# Patient Record
Sex: FEMALE | Race: WHITE | NOT HISPANIC OR LATINO | Employment: FULL TIME | URBAN - METROPOLITAN AREA
[De-identification: names, ages, dates, MRNs, and addresses within clinical notes are randomized per-mention and may not be internally consistent; named-entity substitution may affect disease eponyms.]

---

## 2017-10-20 ENCOUNTER — OFFICE VISIT (OUTPATIENT)
Dept: URGENT CARE | Facility: CLINIC | Age: 57
End: 2017-10-20
Payer: COMMERCIAL

## 2017-10-20 ENCOUNTER — APPOINTMENT (OUTPATIENT)
Dept: RADIOLOGY | Facility: CLINIC | Age: 57
End: 2017-10-20
Payer: COMMERCIAL

## 2017-10-20 DIAGNOSIS — M79.672 PAIN OF LEFT FOOT: ICD-10-CM

## 2017-10-20 PROCEDURE — 73630 X-RAY EXAM OF FOOT: CPT

## 2017-10-20 PROCEDURE — 99203 OFFICE O/P NEW LOW 30 MIN: CPT

## 2017-11-04 NOTE — PROGRESS NOTES
Assessment  1  Left foot pain (729 5) (M20 346)    Plan   Left foot pain    · Ace Bandage; Status:Complete;   Done: [id number]   · Air cast; Status:Temporary Deferral - Pt refuses,wants ace wrap and cast shoe not  comfortable ;    · Post Op Shoe; Status:Complete;   Done: [id number]    * XR FOOT 3+ VIEW LEFT; Status:Resulted - Requires Verification;   Done: [id number] 12:00AM  Due:21Oct2017;Ordered; Stat;    For:Left foot pain; Ordered By:Demetrice Landeros;      Discussion/Summary  Discussion Summary:   Follow up with pcp meds as directed and elevate the foot cast shoe or hard service shoes, limit walking long distance  Medication Side Effects Reviewed: Possible side effects of new medications were reviewed with the patient/guardian today  Understands and agrees with treatment plan: The treatment plan was reviewed with the patient/guardian  The patient/guardian understands and agrees with the treatment plan   Counseling Documentation With Imm: The patient was counseled regarding instructions for management,-- patient and family education,-- importance of compliance with treatment  Follow Up Instructions: Follow Up with your Primary Care Provider in 1-2 days  If your symptoms worsen, go to the nearest [facility] Emergency Department  Chief Complaint  1  Foot Problem  Chief Complaint Free Text Note Form: pt reports L foot pain, felt twinge in foot on lateral aspect; swelling and bruising on medial aspect arch; took ibuprofen @ 1700 tonight, wrapped with ace bandage; pain 2/10      History of Present Illness  HPI: 55 yo female who is here today for left foot pain, pain present in the arch of the left foot, some bruising noted to the left lateral aspect of the left foot, moderate amt of pain to the foot when walking on it  She tripped over a rug on monday and hasnât had much of an issue since until walking up the stairs and felt a twinge   symptoms are worsening and wants [facility] Based Practices Required Assessment:   Pain Assessment   the patient states they have pain  (on a scale of 0 to 10, the patient rates the pain at 2 )   Abuse And Domestic Violence Screen    Yes, the patient is safe at home  -- The patient states no one is hurting them  Depression And Suicide Screen  No, the patient has not had thoughts of hurting themself  No, the patient has not felt depressed in the past 7 days  Prefered Language is  english  Primary Language is  english  Foot Problem: Melum 66 presents with complaints of foot problem  Associated symptoms include foot pain, but-- no foot swelling,-- no foot numbness,-- no foot cramps,-- no discolored toenails,-- no peeling skin-- and-- no ingrown toenails  Review of Systems  Focused-Female:   Constitutional: as noted in HPI  Musculoskeletal: as noted in HPI  Integumentary: as noted in HPI  Neurological: as noted in HPI  ROS Reviewed:   ROS reviewed  Current Meds  1  Farxiga 10 MG Oral Tablet; Therapy: (Recorded:[id number]) to Recorded  2  GlyBURIDE 2 5 MG Oral Tablet; Therapy: (Recorded:20Oct2017) to Recorded  3  Losartan Potassium 25 MG Oral Tablet; Therapy: (Recorded:[id number]) to Recorded  4  MetFORMIN HCl ER (MOD) 1000 MG Oral Tablet Extended Release 24 Hour; Therapy: (Recorded:[id number]) to Recorded  Medication List Reviewed: The medication list was reviewed and updated today  Allergies  1  Ceclor  2  Penicillins    Vitals  Signs   Recorded: [id number] 07:35PM   Temperature: 97 2 F  Heart Rate: 92  Respiration: 16  Systolic: 951  Diastolic: 81  Height: 5 ft 8 in  Weight: 205 lb   BMI Calculated: 31 17  BSA Calculated: 2 07  O2 Saturation: 98  Pain Scale: 2    Physical Exam    Constitutional   General appearance: No acute distress, well appearing and well nourished  Musculoskeletal   Gait and station: Abnormal  -- pain with ambulating full weight on the left foot,  Digits and nails: Normal without clubbing or cyanosis  Inspection/palpation of joints, bones, and muscles: Normal     Skin   Skin and subcutaneous tissue: Normal without rashes or lesions  Neurologic   Reflexes: 2+ and symmetric  Sensation: No sensory loss  Psychiatric   Orientation to person, place, and time: Normal     Mood and affect: Normal        Results/Data  Diagnostic Studies Reviewed: I personally reviewed the films/images/results in the office today  My interpretation follows  X-ray Review no fx  Message  Return to work or school:   Anneliese Gilbert is under my professional care  She was seen in my office on 10/20/2017   She is able to return to work on  10/21/2017      Limit walking long distance, use cast shoe until seen if pain worsens, rest and elevate the foot  Signatures   Electronically signed by :  KELLEE Marvin; Oct 21 2017  5:23PM EST                       (Author)    Electronically signed by : Dallas Fung DO; Nov  3 2017  7:55PM EST                       (Co-author)

## 2017-12-16 ENCOUNTER — OFFICE VISIT (OUTPATIENT)
Dept: URGENT CARE | Facility: CLINIC | Age: 57
End: 2017-12-16
Payer: COMMERCIAL

## 2017-12-16 PROCEDURE — 99213 OFFICE O/P EST LOW 20 MIN: CPT

## 2017-12-20 NOTE — PROGRESS NOTES
Assessment  1  Left foot pain (729 5) (M79 672)   2  Foot infection (686 9) (L08 9)    Plan  Foot infection    · Ciprofloxacin HCl - 500 MG Oral Tablet; TAKE 1 TABLET TWICE DAILY    Discussion/Summary  Discussion Summary:   Follow up with pcp, follow up with ortho on Monday Take meds as directed  Tylenol or Motrin for pain or fever if symptoms worsen discussed streaks on torso or plantar aspect of the left foot go to ER or be Re seen denies having any injury or trauma to the left foot  Medication Side Effects Reviewed: Possible side effects of new medications were reviewed with the patient/guardian today  Understands and agrees with treatment plan: The treatment plan was reviewed with the patient/guardian  The patient/guardian understands and agrees with the treatment plan   Counseling Documentation With Imm: The patient was counseled regarding instructions for management,-- patient and family education,-- importance of compliance with treatment  Follow Up Instructions: Follow Up with your Primary Care Provider in 1-2 days  If your symptoms worsen, go to the [place] [address] Emergency Department  Chief Complaint  1  Foot Problem  Chief Complaint Free Text Note Form: pt reports L foot swelling; was here in September for same issue; swelling and redness noted on outer aspect and 4th and 5th digit; pt reports being on her feet most of yesterday - runs and adult day program; pain 1/10; took ibuprofen 0500      History of Present Illness  HPI: 55 yo female is here today for left foot pain, no change in symptoms since October and has had podiatry care since per pt, she has a lot of redness to the lateral aspect of the left foot and has good ROM  [facility] Practices Required Assessment:  Pain Assessment  the patient states they have pain  (on a scale of 0 to 10, the patient rates the pain at 1 )  Abuse And Domestic Violence Screen   Yes, the patient is safe at home  -- The patient states no one is hurting them  Depression And Suicide Screen  No, the patient has not had thoughts of hurting themself  Yes, the patient has felt depressed in the past 7 days  Referral made to    mother recently passed away  Prefered Language is  english  Primary Language is  english  Review of Systems  Focused-Female:  Constitutional: as noted in HPI  Musculoskeletal: as noted in HPI  Integumentary: as noted in HPI  ROS Reviewed:   ROS reviewed  Active Problems  1  Left foot pain (729 5) (M85 562)    Current Meds   1  Farxiga 10 MG Oral Tablet; Therapy: (Recorded:20Oct2017) to Recorded   2  GlyBURIDE 2 5 MG Oral Tablet; Therapy: (Recorded:20Oct2017) to Recorded   3  Losartan Potassium 25 MG Oral Tablet; Therapy: (Recorded:20Oct2017) to Recorded   4  MetFORMIN HCl ER (MOD) 1000 MG Oral Tablet Extended Release 24 Hour; Therapy: (Recorded:20Oct2017) to Recorded  Medication List Reviewed: The medication list was reviewed and updated today  Allergies  1  Ceclor   2  Penicillins    Vitals  Signs   Recorded: [id number] 10:18AM   Temperature: 97 8 F  Heart Rate: 96  Respiration: 16  Systolic: 871  Diastolic: 99  Height: 5 ft 8 in  Weight: 209 lb   BMI Calculated: 31 78  BSA Calculated: 2 08  O2 Saturation: 99    Physical Exam   Constitutional  General appearance: No acute distress, well appearing and well nourished  Eyes  Conjunctiva and lids: No swelling, erythema or discharge  Pupils and irises: Equal, round and reactive to light  Pulmonary  Respiratory effort: No increased work of breathing or signs of respiratory distress  Auscultation of lungs: Clear to auscultation  Cardiovascular  Auscultation of heart: Normal rate and rhythm, normal S1 and S2, without murmurs  Musculoskeletal  Gait and station: Normal    Digits and nails: Abnormal  -- Patient is 2nd digit of the left foot and 3rd digit are red and inflamed   Erythema expands the dorsal aspect of the left foot at the base of the 2nd 3rd 4th and 5th digits  Patient is able to ambulate and play she was on a slight discomfort  She has a podiatry appointment on Monday  Signatures   Electronically signed by :  KELLEE Sanford; Dec 16 2017 12:40PM EST                       (Author)    Electronically signed by : Damir Harvey DO; Dec 19 2017  2:54PM EST                       (Co-author)

## 2018-01-18 NOTE — MISCELLANEOUS
Message  Return to work or school:   Mumtaz Sanchez is under my professional care  She was seen in my office on 10/20/2017   She is able to return to work on  10/21/2017      Limit walking long distance, use cast shoe until seen if pain worsens, rest and elevate the foot  Signatures   Electronically signed by : KELLEE Huddleston; Oct 21 2017  5:23PM EST                       (Author)    Electronically signed by :  KELLEE Huddleston; Oct 21 2017  5:25PM EST                          Electronically signed by : Catarino Sams DO; Nov  3 2017  7:55PM EST                       (Co-author)

## 2018-01-23 VITALS
DIASTOLIC BLOOD PRESSURE: 99 MMHG | HEIGHT: 68 IN | BODY MASS INDEX: 31.67 KG/M2 | RESPIRATION RATE: 16 BRPM | TEMPERATURE: 97.8 F | OXYGEN SATURATION: 99 % | WEIGHT: 209 LBS | HEART RATE: 96 BPM | SYSTOLIC BLOOD PRESSURE: 169 MMHG

## 2018-05-21 ENCOUNTER — TRANSCRIBE ORDERS (OUTPATIENT)
Dept: ADMINISTRATIVE | Facility: HOSPITAL | Age: 58
End: 2018-05-21

## 2018-05-21 DIAGNOSIS — I73.9 PERIPHERAL VASCULAR DISEASE, UNSPECIFIED (HCC): Primary | ICD-10-CM

## 2018-05-25 ENCOUNTER — HOSPITAL ENCOUNTER (OUTPATIENT)
Dept: NON INVASIVE DIAGNOSTICS | Facility: HOSPITAL | Age: 58
Discharge: HOME | End: 2018-05-25
Attending: PODIATRIST
Payer: COMMERCIAL

## 2018-05-25 DIAGNOSIS — I73.9 PERIPHERAL VASCULAR DISEASE, UNSPECIFIED (HCC): ICD-10-CM

## 2018-05-25 PROCEDURE — 93925 LOWER EXTREMITY STUDY: CPT | Performed by: SURGERY

## 2018-05-25 PROCEDURE — 93925 LOWER EXTREMITY STUDY: CPT

## 2018-05-25 PROCEDURE — 93923 UPR/LXTR ART STDY 3+ LVLS: CPT

## 2018-05-25 PROCEDURE — 93922 UPR/L XTREMITY ART 2 LEVELS: CPT | Performed by: SURGERY

## 2018-06-30 ENCOUNTER — HOSPITAL ENCOUNTER (OUTPATIENT)
Dept: RADIOLOGY | Facility: HOSPITAL | Age: 58
Discharge: HOME | End: 2018-06-30
Attending: PODIATRIST
Payer: COMMERCIAL

## 2018-06-30 ENCOUNTER — TRANSCRIBE ORDERS (OUTPATIENT)
Dept: ADMINISTRATIVE | Facility: HOSPITAL | Age: 58
End: 2018-06-30

## 2018-06-30 DIAGNOSIS — M79.672 LEFT FOOT PAIN: Primary | ICD-10-CM

## 2018-06-30 DIAGNOSIS — M79.672 LEFT FOOT PAIN: ICD-10-CM

## 2018-06-30 PROCEDURE — 73630 X-RAY EXAM OF FOOT: CPT

## 2019-02-21 ENCOUNTER — TELEPHONE (OUTPATIENT)
Dept: FAMILY MEDICINE CLINIC | Facility: CLINIC | Age: 59
End: 2019-02-21

## 2020-01-06 ENCOUNTER — OFFICE VISIT (OUTPATIENT)
Dept: PODIATRY | Facility: CLINIC | Age: 60
End: 2020-01-06
Payer: COMMERCIAL

## 2020-01-06 VITALS
HEIGHT: 68 IN | DIASTOLIC BLOOD PRESSURE: 80 MMHG | HEART RATE: 83 BPM | WEIGHT: 166 LBS | SYSTOLIC BLOOD PRESSURE: 121 MMHG | BODY MASS INDEX: 25.16 KG/M2

## 2020-01-06 DIAGNOSIS — E11.42 DIABETIC POLYNEUROPATHY ASSOCIATED WITH TYPE 2 DIABETES MELLITUS (HCC): ICD-10-CM

## 2020-01-06 DIAGNOSIS — M14.672 CHARCOT'S JOINT OF LEFT FOOT: Primary | ICD-10-CM

## 2020-01-06 DIAGNOSIS — L84 CORNS AND CALLOSITIES: ICD-10-CM

## 2020-01-06 PROBLEM — E11.40 DIABETIC NEUROPATHY (HCC): Status: ACTIVE | Noted: 2020-01-06

## 2020-01-06 PROCEDURE — 11055 PARING/CUTG B9 HYPRKER LES 1: CPT | Performed by: PODIATRIST

## 2020-01-06 PROCEDURE — 99213 OFFICE O/P EST LOW 20 MIN: CPT | Performed by: PODIATRIST

## 2020-01-06 RX ORDER — LOSARTAN POTASSIUM 25 MG/1
TABLET ORAL
COMMUNITY

## 2020-01-06 RX ORDER — SIMVASTATIN 5 MG
TABLET ORAL
COMMUNITY
Start: 2019-12-19

## 2020-01-06 NOTE — PROGRESS NOTES
This patient was seen on 1/6/20  Assessment:    Problem List Items Addressed This Visit        Endocrine    Diabetic neuropathy ([facility] [address] )    Relevant Medications    metFORMIN (GLUCOPHAGE) 1000 MG tablet    Other Relevant Orders    Diabetic Shoe    Diabetic Shoe Inserts       Musculoskeletal and Integument    Charcot's joint of left foot - Primary    Relevant Orders    Diabetic Shoe    Diabetic Shoe Inserts          Plan:  1  DM foot exam performed  2  Callous pared down with scalpel x 1  Lesion Destruction  Date/Time: 1/6/2020 9:14 AM  Performed by: Olivia Suazo DPM  Authorized by: Olivia Suazo DPM     Procedure Details - Lesion Destruction:     Number of Lesions:  1  Lesion 1:     Body area:  Lower extremity    Lower extremity location:  L foot    Malignancy: benign hyperkeratotic lesion      Destruction method: scissors used for extraction          3  Rx for new shoes and insoles written with instructions to accommodate Left foot deformity     Diagnoses and all orders for this visit:    Charcot's joint of left foot  -     Diabetic Shoe  -     Diabetic Shoe Inserts    Diabetic polyneuropathy associated with type 2 diabetes mellitus ([facility] [address] )  -     Diabetic Shoe  -     Diabetic Shoe Inserts    Other orders  -     metFORMIN (GLUCOPHAGE) 1000 MG tablet; metformin  -     losartan (COZAAR) 25 mg tablet; Take by mouth  -     simvastatin (ZOCOR) 5 MG tablet          Subjective:      Patient ID: Joellen Cardenas is a 64 y o  female  [place] is known to me from past treatment of an acute Charcot neuroarthropathy of her Left foot  She currently wears custom insoles and DM shoes  She denies renewed swelling of her Left foot  She performs daily self evaluations of her feet  She's noticed some callous formation Left and is concerned and came in for evaluation        The following portions of the patient's history were reviewed and updated as appropriate: allergies, current medications, past family history, past medical history, past social history, past surgical history and problem list     Review of Systems   Constitutional: Negative  Respiratory: Negative  Cardiovascular: Negative  Gastrointestinal: Negative  Neurological: Positive for numbness  Hematological: Negative  Psychiatric/Behavioral: Negative  Objective:      /80   Pulse 83   Ht 5' 8" (1 727 m)   Wt 75 3 kg (166 lb)   BMI 25 24 kg/m²          Physical Exam   Constitutional: She is oriented to person, place, and time  She appears well-developed  No distress  HENT:   Head: Normocephalic  Eyes: Pupils are equal, round, and reactive to light  Neck: Normal range of motion  Cardiovascular: Pulses are no weak pulses  Pulses:       Dorsalis pedis pulses are 2+ on the right side, and 2+ on the left side  Posterior tibial pulses are 2+ on the right side, and 2+ on the left side  Pulmonary/Chest: Effort normal and breath sounds normal    Abdominal: Soft  Bowel sounds are normal    Musculoskeletal: She exhibits deformity  She exhibits no tenderness  Right foot: There is normal range of motion and no deformity  Left foot: There is decreased range of motion and deformity  Feet:   Right Foot:   Protective Sensation: 10 sites tested  3 sites sensed  Skin Integrity: Negative for ulcer, skin breakdown, erythema, warmth, callus or dry skin  Left Foot:   Protective Sensation: 10 sites tested  3 sites sensed  Skin Integrity: Positive for callus  Negative for ulcer, skin breakdown, erythema, warmth or dry skin  Neurological: She is alert and oriented to person, place, and time  A sensory deficit is present  Skin: Skin is warm  Capillary refill takes less than 2 seconds  She is not diaphoretic  I note a non-ulcerated hyperkeratotic lesion on the lateral Left foot adjacent to the styloid process  Psychiatric: She has a normal mood and affect  Nursing note and vitals reviewed        Diabetic Foot Exam    Patient's shoes and socks removed  Right Foot/Ankle   Right Foot Inspection  Skin Exam: skin normal and skin intact no dry skin, no warmth, no callus, no erythema, no maceration, no abnormal color, no pre-ulcer, no ulcer and no callus                          Toe Exam: no swelling, no tenderness, erythema and  no right toe deformity  Sensory   Vibration: diminished  Proprioception: diminished   Monofilament testing: diminished  Vascular  Capillary refills: < 3 seconds  The right DP pulse is 2+  The right PT pulse is 2+  Left Foot/Ankle  Left Foot Inspection  Skin Exam: skin normal, skin intact and callusno dry skin, no warmth, no erythema, no maceration, normal color, no pre-ulcer and no ulcer                         Toe Exam: left toe deformityno swelling, no tenderness and no erythema                   Sensory   Vibration: diminished  Proprioception: diminished  Monofilament: diminished  Vascular  Capillary refills: < 3 seconds  The left DP pulse is 2+  The left PT pulse is 2+  Assign Risk Category:  Deformity present; Loss of protective sensation;  No weak pulses       Risk: 2

## 2020-01-06 NOTE — PATIENT INSTRUCTIONS
Foot Care for People with Diabetes   WHAT YOU NEED TO KNOW:   · Foot care helps protect your feet and prevent foot ulcers or sores  Long-term high blood sugar levels can damage the blood vessels and nerves in your legs and feet  This damage makes it hard to feel pressure, pain, temperature, and touch  You may not be able to feel a cut or sore, or shoes that are too tight  Foot care is needed to prevent serious problems, such as an infection or amputation  · Diabetes may cause your toes to become crooked or curved under  These changes may affect the way you walk and can lead to increased pressure on your foot  The pressure can decrease blood flow to your feet  Lack of blood flow increases your risk for a foot ulcer  Do not ignore small problems, such as dry skin or small wounds  These can become life-threatening over time without proper care  DISCHARGE INSTRUCTIONS:   Contact your healthcare provider if:   · Your feet become numb, weak, or hard to move  · You have pus draining from a sore on your foot  · You have a wound on your foot that gets bigger, deeper, or does not heal      · You see blisters, cuts, scratches, calluses, or sores on your foot  · You have a fever, and your feet become red, warm, and swollen  · Your toenails become thick, curled, or yellow  · You find it hard to check your feet because your vision is poor  · You have questions or concerns about your condition or care  Foot care:   · Check your feet each day  Look at your whole foot, including the bottom, and between and under your toes  Check for wounds, corns, and calluses  Use a mirror to see the bottom of your feet  The skin on your feet may be shiny, tight, or darker than normal  Your feet may also be cold and pale  Feel your feet by running your hands along the tops, bottoms, sides, and between your toes  Redness, swelling, and warmth are signs of blood flow problems that can lead to a foot ulcer   Do not try to remove corns or calluses yourself  · Wash your feet each day with soap and warm water  Do not use hot water, because this can injure your foot  Dry your feet gently with a towel after you wash them  Dry between and under your toes  · Apply lotion or a moisturizer on your dry feet  Ask your healthcare provider what lotions are best to use  Do not put lotion or moisturizer between your toes  · Cut your toenails correctly  File or cut your toenails straight across  Use a soft brush to clean around your toenails  If your toenails are very thick, you may need to have a healthcare provider or specialist cut them  · Protect your feet  Do not walk barefoot or wear your shoes without socks  Check your shoes for rocks or other objects that can hurt your feet  Wear cotton socks to help keep your feet dry  Wear socks without toe seams, or wear them with the seams inside out  Change your socks each day  Do not wear socks that are dirty or damp  · Wear shoes that fit well  Wear shoes that do not rub against any area of your feet  Your shoes should be ½ to ¾ inch (1 to 2 centimeters) longer than your feet  Your shoes should also have extra space around the widest part of your feet  Walking or athletic shoes with laces or straps that adjust are best  Ask your healthcare provider for help to choose shoes that fit you best  Ask him if you need to wear an insert, orthotic, or bandage on your feet  Follow up with your healthcare provider or foot specialist as directed: You will need to have your feet checked at least once a year  You may need a foot exam more often if you have nerve damage, foot deformities, or ulcers  Write down your questions so you remember to ask them during your visits  © 2017 [employer] Information is for End User's use only and may not be sold, redistributed or otherwise used for commercial purposes   All illustrations and images included in CareNotes® are the copyrighted property of [employer]  or [employer]  The above information is an  only  It is not intended as medical advice for individual conditions or treatments  Talk to your doctor, nurse or pharmacist before following any medical regimen to see if it is safe and effective for you

## 2020-07-23 ENCOUNTER — HOSPITAL ENCOUNTER (EMERGENCY)
Facility: HOSPITAL | Age: 60
Discharge: HOME | End: 2020-07-23
Attending: EMERGENCY MEDICINE
Payer: COMMERCIAL

## 2020-07-23 ENCOUNTER — APPOINTMENT (EMERGENCY)
Dept: RADIOLOGY | Facility: HOSPITAL | Age: 60
End: 2020-07-23
Payer: COMMERCIAL

## 2020-07-23 VITALS
RESPIRATION RATE: 20 BRPM | HEIGHT: 67 IN | TEMPERATURE: 98.1 F | BODY MASS INDEX: 25.11 KG/M2 | WEIGHT: 160 LBS | HEART RATE: 95 BPM | OXYGEN SATURATION: 99 % | DIASTOLIC BLOOD PRESSURE: 93 MMHG | SYSTOLIC BLOOD PRESSURE: 175 MMHG

## 2020-07-23 DIAGNOSIS — M14.672 CHARCOT'S JOINT OF LEFT FOOT: Primary | ICD-10-CM

## 2020-07-23 DIAGNOSIS — M19.90 OSTEOARTHRITIS: ICD-10-CM

## 2020-07-23 PROCEDURE — 73610 X-RAY EXAM OF ANKLE: CPT

## 2020-07-23 PROCEDURE — 99283 EMERGENCY DEPT VISIT LOW MDM: CPT

## 2020-07-23 PROCEDURE — 99284 EMERGENCY DEPT VISIT MOD MDM: CPT | Performed by: PHYSICIAN ASSISTANT

## 2020-07-23 RX ORDER — NAPROXEN 500 MG/1
500 TABLET ORAL 2 TIMES DAILY WITH MEALS
Qty: 14 TABLET | Refills: 0 | Status: SHIPPED | OUTPATIENT
Start: 2020-07-23 | End: 2020-09-14 | Stop reason: SDDI

## 2020-07-23 NOTE — ED PROVIDER NOTES
History  Chief Complaint   Patient presents with    Foot Swelling     Pt reports left foot is swollen x 1 week  65 yo female w/ hx of NIDDM and Charcot foot presents to the Emergency Department for evaluation of L foot swelling x 3 days  Denies trauma  Has minimal pain, but does admit to baseline neuropathy  No gait changes  Has chronic deformity secondary to Charcot but states swelling is much more prominent  No recent immobilization/surgeries, no hx of VTE  Denies fevers, chills, sweats, N/V/D  Prior to Admission Medications   Prescriptions Last Dose Informant Patient Reported? Taking?   losartan (COZAAR) 25 mg tablet   Yes No   Sig: Take by mouth   metFORMIN (GLUCOPHAGE) 1000 MG tablet   Yes No   Sig: metformin   sertraline (ZOLOFT) 50 mg tablet 7/23/2020 at Unknown time  Yes Yes   Sig: Take 50 mg by mouth daily   simvastatin (ZOCOR) 5 MG tablet   Yes No      Facility-Administered Medications: None       Past Medical History:   Diagnosis Date    Charcot ankle     Diabetes mellitus ([facility] )        History reviewed  No pertinent surgical history  History reviewed  No pertinent family history  I have reviewed and agree with the history as documented  E-Cigarette/Vaping     E-Cigarette/Vaping Substances    Nicotine No     THC No     CBD No     Flavoring No     Other No     Unknown No      Social History     Tobacco Use    Smoking status: Never Smoker    Smokeless tobacco: Never Used   Substance Use Topics    Alcohol use: Never     Frequency: Never    Drug use: Never       Review of Systems   Constitutional: Negative for fever  Musculoskeletal: Positive for joint swelling  Negative for arthralgias and myalgias  Skin: Negative for color change and wound  Neurological: Negative for weakness and numbness  All other systems reviewed and are negative  Physical Exam  Physical Exam   Constitutional: She is oriented to person, place, and time   She appears well-developed and well-nourished  No distress  HENT:   Head: Normocephalic and atraumatic  Mouth/Throat: Oropharynx is clear and moist    Eyes: Pupils are equal, round, and reactive to light  No scleral icterus  Cardiovascular: Normal rate and regular rhythm  Exam reveals no gallop and no friction rub  No murmur heard  Pulmonary/Chest: No respiratory distress  She has no wheezes  She has no rales  Musculoskeletal: She exhibits deformity  Charcot deformity of L foot  Mild edema vs effusion at tibiotalar joint  No LLE edema  Normal DP pulses, normal sensation/ROM  No erythema or open wounds  Neurological: She is alert and oriented to person, place, and time  Skin: Skin is dry  Capillary refill takes less than 2 seconds  She is not diaphoretic  Psychiatric: She has a normal mood and affect  Her behavior is normal    Vitals reviewed  Vital Signs  ED Triage Vitals [07/23/20 1554]   Temperature Pulse Respirations Blood Pressure SpO2   98 1 °F (36 7 °C) 95 20 (!) 175/93 99 %      Temp Source Heart Rate Source Patient Position - Orthostatic VS BP Location FiO2 (%)   Temporal Monitor Sitting Right arm --      Pain Score       1           Vitals:    07/23/20 1554   BP: (!) 175/93   Pulse: 95   Patient Position - Orthostatic VS: Sitting         Visual Acuity      ED Medications  Medications - No data to display    Diagnostic Studies  Results Reviewed     None                 XR ankle 3+ views LEFT    (Results Pending)              Procedures  Procedures         ED Course       US AUDIT      Most Recent Value   Initial Alcohol Screen: US AUDIT-C    1  How often do you have a drink containing alcohol?  0 Filed at: 07/23/2020 1555   2  How many drinks containing alcohol do you have on a typical day you are drinking? 0 Filed at: 07/23/2020 1555   3a  Male UNDER 65: How often do you have five or more drinks on one occasion? 0 Filed at: 07/23/2020 1555   3b  FEMALE Any Age, or MALE 65+:  How often do you have 4 or more drinks on one occassion? 0 Filed at: 07/23/2020 1555   Audit-C Score  0 Filed at: 07/23/2020 1555                  BECKY/DAST-10      Most Recent Value   How many times in the past year have you    Used an illegal drug or used a prescription medication for non-medical reasons? Never Filed at: 07/23/2020 1556                                [facility]  Number of Diagnoses or Management Options  Charcot's joint of left foot:   Osteoarthritis: new and requires workup  Diagnosis management comments: XR reveals severe degradation of the tibio-talar joint suggesting acute OA flare  Will start NSAIDs and refer to podiatry       Amount and/or Complexity of Data Reviewed  Tests in the radiology section of CPT®: ordered and reviewed  Review and summarize past medical records: yes  Independent visualization of images, tracings, or specimens: yes          Disposition  Final diagnoses:   Charcot's joint of left foot   Osteoarthritis     Time reflects when diagnosis was documented in both MDM as applicable and the Disposition within this note     Time User Action Codes Description Comment    7/23/2020  4:45 PM Gracefabio Murray Add [F26 279] Charcot's joint of left foot     7/23/2020  4:45 PM Grace Murray Add [M19 90] Osteoarthritis       ED Disposition     ED Disposition Condition Date/Time Comment    Discharge Stable Thu Jul 23, 2020  4:45  [address] [facility] [address] discharge to home/self care              Follow-up Information     Follow up With Specialties Details Why Contact Info    Sanchez Neff DPM Podiatry, Wound Care Schedule an appointment as soon as possible for a visit   [address] [address] [phone]            Discharge Medication List as of 7/23/2020  4:46 PM      START taking these medications    Details   naproxen (NAPROSYN) 500 mg tablet Take 1 tablet (500 mg total) by mouth 2 (two) times a day with meals for 7 days, Starting Thu 7/23/2020, Until Thu 7/30/2020, Normal         CONTINUE these medications which have NOT CHANGED    Details   sertraline (ZOLOFT) 50 mg tablet Take 50 mg by mouth daily, Historical Med      losartan (COZAAR) 25 mg tablet Take by mouth, Historical Med      metFORMIN (GLUCOPHAGE) 1000 MG tablet metformin, Historical Med      simvastatin (ZOCOR) 5 MG tablet Starting Thu 12/19/2019, Historical Med           No discharge procedures on file      PDMP Review     None          ED Provider  Electronically Signed by           Luis Aviles PA-C  07/23/20 2362

## 2020-07-27 ENCOUNTER — TELEPHONE (OUTPATIENT)
Dept: PODIATRY | Facility: CLINIC | Age: 60
End: 2020-07-27

## 2020-07-27 NOTE — TELEPHONE ENCOUNTER
Patient called last week asking for an appointment with Dr Javier Henson as her charcot foot was swollen  I explained that Dr Javier Henson was out of the [address] [place] [address] office for two weeks and he was already booked solid  The patient was concerned about her foot and asked what she should do - I explained that she should go to the ER to get checked there  The patient called this AM stating that she went to the ER and they took xrays of her foot  She stated her foot is no longer swollen but she would like Dr Javier Henson to take a look at her xrays  I scheduled her for an appointment on 08/10/2020 in [address] [place] [address] but she would like to hear about her xrays before then  Will reach out to Dr Javier Henson and return the patient's call once I hear back about her xray results

## 2020-08-04 NOTE — TELEPHONE ENCOUNTER
Called patient and let her know that there were no changes between her recent xrays and the xrays from 2018  She was happy to hear there were no changes, asked why her foot was so swollen though and asked if she should still keep her appointment  I told her to keep her appointment so she could ask Dr Sheba Wallace about why her foot swelled

## 2020-08-04 NOTE — TELEPHONE ENCOUNTER
Please call her, let her know I did review the xrays and compared them to the 2018 xrays and didn't see any new changes

## 2020-08-10 ENCOUNTER — OFFICE VISIT (OUTPATIENT)
Dept: PODIATRY | Facility: CLINIC | Age: 60
End: 2020-08-10
Payer: COMMERCIAL

## 2020-08-10 VITALS — WEIGHT: 160 LBS | BODY MASS INDEX: 25.11 KG/M2 | TEMPERATURE: 97.6 F | HEIGHT: 67 IN

## 2020-08-10 DIAGNOSIS — M14.672 CHARCOT'S JOINT OF LEFT FOOT: Primary | ICD-10-CM

## 2020-08-10 DIAGNOSIS — E11.42 DIABETIC POLYNEUROPATHY ASSOCIATED WITH TYPE 2 DIABETES MELLITUS (HCC): ICD-10-CM

## 2020-08-10 PROCEDURE — 99214 OFFICE O/P EST MOD 30 MIN: CPT | Performed by: PODIATRIST

## 2020-08-10 NOTE — PROGRESS NOTES
This patient was seen on 8/10/20  Assessment:    Problem List Items Addressed This Visit        Endocrine    Diabetic neuropathy ([address] )       Musculoskeletal and Integument    Charcot's joint of left foot - Primary    Relevant Orders    Cam Boot          Plan:  I reviewed the xrays with her  Bilateral infrared cutaneous thermistor measurements were obtained after allowing his skin to equilibrate to room temperature air for 15 minutes after cast boot and shoe removal  10 sites were tested on the acute Charcot foot and the warmest site was selected for comparison to the contralateral foot  The differential is noted to be 9 5 degrees Fahrenheit  Based on the temperatures, I feel she has a new acute Charcot event superimposed on her prior chronic Charcot  I recommended the gold standard of strict NWB and immobilization  She flatly refused the NWB  I explained very clearly to her that continued WB, even in a boot, could lead to progressive deformity, skin breakdown, ulcers, infections and even limb loss over time  She verbalized she understood this  I prescribed a camboot to be worn at all times and she accepted this  Diagnoses and all orders for this visit:    Charcot's joint of left foot  -     Cam Boot    Diabetic polyneuropathy associated with type 2 diabetes mellitus (HCC)          Subjective:      Patient ID: Marko Redd is a 64 y o  female  [place] is here with new onset Left foot edema  She did go to the ER and had xrays that I looked at previously  She denies specific trauma to the foot  The following portions of the patient's history were reviewed and updated as appropriate: allergies, current medications, past family history, past medical history, past social history, past surgical history and problem list     Review of Systems   Constitutional: Positive for activity change  Negative for appetite change, chills, diaphoresis, fatigue, fever and unexpected weight change  Respiratory: Negative  Cardiovascular: Negative  Gastrointestinal: Negative  Musculoskeletal: Positive for joint swelling  Neurological: Positive for numbness  Psychiatric/Behavioral: Negative  Objective:      Temp 97 6 °F (36 4 °C)   Ht 5' 7" (1 702 m)   Wt 72 6 kg (160 lb)   BMI 25 06 kg/m²          Physical Exam  Vitals signs and nursing note reviewed  Constitutional:       Appearance: Normal appearance  HENT:      Head: Normocephalic  Cardiovascular:      Rate and Rhythm: Normal rate  Pulses: Normal pulses  Dorsalis pedis pulses are 2+ on the right side and 2+ on the left side  Posterior tibial pulses are 2+ on the right side and 2+ on the left side  Pulmonary:      Effort: Pulmonary effort is normal    Musculoskeletal:         General: Swelling and deformity present  Feet:      Right foot:      Protective Sensation: 10 sites tested  2 sites sensed  Left foot:      Protective Sensation: 10 sites tested  1 site sensed  Skin:     General: Skin is warm and dry  Capillary Refill: Capillary refill takes less than 2 seconds  Neurological:      General: No focal deficit present  Mental Status: She is alert

## 2020-08-24 ENCOUNTER — OFFICE VISIT (OUTPATIENT)
Dept: PODIATRY | Facility: CLINIC | Age: 60
End: 2020-08-24
Payer: COMMERCIAL

## 2020-08-24 VITALS — TEMPERATURE: 97.2 F | HEIGHT: 67 IN | BODY MASS INDEX: 25.06 KG/M2

## 2020-08-24 DIAGNOSIS — M14.672 CHARCOT'S JOINT OF LEFT FOOT: Primary | ICD-10-CM

## 2020-08-24 DIAGNOSIS — E11.42 DIABETIC POLYNEUROPATHY ASSOCIATED WITH TYPE 2 DIABETES MELLITUS (HCC): ICD-10-CM

## 2020-08-24 PROCEDURE — 99213 OFFICE O/P EST LOW 20 MIN: CPT | Performed by: PODIATRIST

## 2020-08-24 NOTE — PROGRESS NOTES
This patient was seen on 8/24/20  Assessment:    Problem List Items Addressed This Visit        Endocrine    Diabetic neuropathy ([address] )       Musculoskeletal and Integument    Charcot's joint of left foot - Primary          Plan:  I recommend continued consistent use of the camboot  I once again reiterated that NWB is the gold standard but she's have none of it and stated "I just can't do that"  Follow-up check in two weeks  Diagnoses and all orders for this visit:    Charcot's joint of left foot    Diabetic polyneuropathy associated with type 2 diabetes mellitus (HCC)          Subjective:      Patient ID: Maria T Ty is a 64 y o  female  [place] is here management of the acute Charcot foot with  Left foot edema  She has consistently been using the camboot and noted an immediate reduction in edema once starting that  The following portions of the patient's history were reviewed and updated as appropriate: allergies, current medications, past family history, past medical history, past social history, past surgical history and problem list     Review of Systems   Constitutional: Positive for activity change  Negative for appetite change, chills, diaphoresis, fatigue, fever and unexpected weight change  Respiratory: Negative  Cardiovascular: Negative  Gastrointestinal: Negative  Musculoskeletal: Positive for joint swelling  Neurological: Positive for numbness  Psychiatric/Behavioral: Negative  Objective:      Temp (!) 97 2 °F (36 2 °C)   Ht 5' 7" (1 702 m)   BMI 25 06 kg/m²          Physical Exam  Vitals signs and nursing note reviewed  Constitutional:       Appearance: Normal appearance  HENT:      Head: Normocephalic  Cardiovascular:      Rate and Rhythm: Normal rate  Pulses: Normal pulses  Dorsalis pedis pulses are 2+ on the right side and 2+ on the left side  Posterior tibial pulses are 2+ on the right side and 2+ on the left side  Pulmonary:      Effort: Pulmonary effort is normal    Musculoskeletal:         General: Swelling and deformity present  Feet:      Right foot:      Protective Sensation: 10 sites tested  2 sites sensed  Left foot:      Protective Sensation: 10 sites tested  1 site sensed  Skin:     General: Skin is warm and dry  Capillary Refill: Capillary refill takes less than 2 seconds  Neurological:      General: No focal deficit present  Mental Status: She is alert  I note some pre-existing Left foot deformity, but no change since prior visits  Minimal edema Left noted  Bilateral infrared cutaneous thermistor measurements were obtained after allowing his skin to equilibrate to room temperature air for 15 minutes after cast boot and shoe removal  10 sites were tested on the acute Charcot foot and the warmest site was selected for comparison to the contralateral foot  The differential is noted to be 6 degrees Fahrenheit

## 2020-09-14 ENCOUNTER — OFFICE VISIT (OUTPATIENT)
Dept: PODIATRY | Facility: CLINIC | Age: 60
End: 2020-09-14
Payer: COMMERCIAL

## 2020-09-14 VITALS — TEMPERATURE: 97.5 F | BODY MASS INDEX: 25.11 KG/M2 | HEIGHT: 67 IN | WEIGHT: 160 LBS

## 2020-09-14 DIAGNOSIS — M14.672 CHARCOT'S JOINT OF LEFT FOOT: Primary | ICD-10-CM

## 2020-09-14 DIAGNOSIS — E11.42 DIABETIC POLYNEUROPATHY ASSOCIATED WITH TYPE 2 DIABETES MELLITUS (HCC): ICD-10-CM

## 2020-09-14 PROCEDURE — 99213 OFFICE O/P EST LOW 20 MIN: CPT | Performed by: PODIATRIST

## 2020-09-14 NOTE — PROGRESS NOTES
This patient was seen on 9/14/20  Assessment:    Problem List Items Addressed This Visit        Endocrine    Diabetic neuropathy ([address] )       Musculoskeletal and Integument    Charcot's joint of left foot - Primary          Plan:  I recommend continue limited WB in the camboot  I recommend a longer sock to prevent irritation in the boot  Diagnoses and all orders for this visit:    Charcot's joint of left foot    Diabetic polyneuropathy associated with type 2 diabetes mellitus (HCC)          Subjective:      Patient ID: Frankey Basque is a 64 y o  female  [place] is here management of the acute Charcot foot with  Left foot edema  She has consistently been using the camboot  The following portions of the patient's history were reviewed and updated as appropriate: allergies, current medications, past family history, past medical history, past social history, past surgical history and problem list     Review of Systems   Constitutional: Positive for activity change  Negative for appetite change, chills, diaphoresis, fatigue, fever and unexpected weight change  Respiratory: Negative  Cardiovascular: Negative  Gastrointestinal: Negative  Musculoskeletal: Positive for joint swelling  Neurological: Positive for numbness  Psychiatric/Behavioral: Negative  Objective:      Temp 97 5 °F (36 4 °C)   Ht 5' 7" (1 702 m)   Wt 72 6 kg (160 lb) Comment: Verbal  BMI 25 06 kg/m²          Physical Exam  Vitals signs and nursing note reviewed  Constitutional:       Appearance: Normal appearance  HENT:      Head: Normocephalic  Cardiovascular:      Rate and Rhythm: Normal rate  Pulses: Normal pulses  Dorsalis pedis pulses are 2+ on the right side and 2+ on the left side  Posterior tibial pulses are 2+ on the right side and 2+ on the left side  Pulmonary:      Effort: Pulmonary effort is normal    Musculoskeletal:         General: Swelling and deformity present  Feet:      Right foot:      Protective Sensation: 10 sites tested  2 sites sensed  Left foot:      Protective Sensation: 10 sites tested  1 site sensed  Skin:     General: Skin is warm and dry  Capillary Refill: Capillary refill takes less than 2 seconds  Neurological:      General: No focal deficit present  Mental Status: She is alert  Bilateral infrared cutaneous thermistor measurements were obtained after allowing his skin to equilibrate to room temperature air for 15 minutes after cast boot and shoe removal  10 sites were tested on the acute Charcot foot and the warmest site was selected for comparison to the contralateral foot  The differential is noted to be 5 degrees Fahrenheit

## 2020-10-12 ENCOUNTER — OFFICE VISIT (OUTPATIENT)
Dept: PODIATRY | Facility: CLINIC | Age: 60
End: 2020-10-12
Payer: COMMERCIAL

## 2020-10-12 VITALS
WEIGHT: 165 LBS | BODY MASS INDEX: 25.01 KG/M2 | HEIGHT: 68 IN | SYSTOLIC BLOOD PRESSURE: 120 MMHG | TEMPERATURE: 98.1 F | DIASTOLIC BLOOD PRESSURE: 72 MMHG

## 2020-10-12 DIAGNOSIS — M14.672 CHARCOT'S JOINT OF LEFT FOOT: Primary | ICD-10-CM

## 2020-10-12 DIAGNOSIS — E11.42 DIABETIC POLYNEUROPATHY ASSOCIATED WITH TYPE 2 DIABETES MELLITUS (HCC): ICD-10-CM

## 2020-10-12 PROCEDURE — 99213 OFFICE O/P EST LOW 20 MIN: CPT | Performed by: PODIATRIST

## 2020-10-29 ENCOUNTER — TELEPHONE (OUTPATIENT)
Dept: PODIATRY | Facility: CLINIC | Age: 60
End: 2020-10-29

## 2020-10-30 DIAGNOSIS — L03.119 CELLULITIS AND ABSCESS OF FOOT: Primary | ICD-10-CM

## 2020-10-30 DIAGNOSIS — L02.619 CELLULITIS AND ABSCESS OF FOOT: Primary | ICD-10-CM

## 2020-10-30 RX ORDER — CIPROFLOXACIN 500 MG/1
500 TABLET, FILM COATED ORAL EVERY 12 HOURS SCHEDULED
Qty: 10 TABLET | Refills: 0 | Status: SHIPPED | OUTPATIENT
Start: 2020-10-30 | End: 2020-11-04

## 2020-11-02 ENCOUNTER — OFFICE VISIT (OUTPATIENT)
Dept: PODIATRY | Facility: CLINIC | Age: 60
End: 2020-11-02
Payer: COMMERCIAL

## 2020-11-02 VITALS — TEMPERATURE: 98 F | BODY MASS INDEX: 25.01 KG/M2 | HEIGHT: 68 IN | WEIGHT: 165 LBS

## 2020-11-02 DIAGNOSIS — L97.421 ULCER OF LEFT HEEL, LIMITED TO BREAKDOWN OF SKIN (HCC): Primary | ICD-10-CM

## 2020-11-02 DIAGNOSIS — E11.42 DIABETIC POLYNEUROPATHY ASSOCIATED WITH TYPE 2 DIABETES MELLITUS (HCC): ICD-10-CM

## 2020-11-02 PROCEDURE — 99213 OFFICE O/P EST LOW 20 MIN: CPT | Performed by: PODIATRIST

## 2020-11-02 PROCEDURE — 97597 DBRDMT OPN WND 1ST 20 CM/<: CPT | Performed by: PODIATRIST

## 2020-11-16 ENCOUNTER — OFFICE VISIT (OUTPATIENT)
Dept: PODIATRY | Facility: CLINIC | Age: 60
End: 2020-11-16
Payer: COMMERCIAL

## 2020-11-16 VITALS
DIASTOLIC BLOOD PRESSURE: 78 MMHG | BODY MASS INDEX: 25.76 KG/M2 | HEIGHT: 68 IN | TEMPERATURE: 97.8 F | SYSTOLIC BLOOD PRESSURE: 120 MMHG | WEIGHT: 170 LBS

## 2020-11-16 DIAGNOSIS — L97.421 ULCER OF LEFT HEEL, LIMITED TO BREAKDOWN OF SKIN (HCC): ICD-10-CM

## 2020-11-16 DIAGNOSIS — M14.672 CHARCOT'S JOINT OF LEFT FOOT: Primary | ICD-10-CM

## 2020-11-16 DIAGNOSIS — E11.42 DIABETIC POLYNEUROPATHY ASSOCIATED WITH TYPE 2 DIABETES MELLITUS (HCC): ICD-10-CM

## 2020-11-16 PROCEDURE — 99213 OFFICE O/P EST LOW 20 MIN: CPT | Performed by: PODIATRIST

## 2020-12-07 ENCOUNTER — OFFICE VISIT (OUTPATIENT)
Dept: PODIATRY | Facility: CLINIC | Age: 60
End: 2020-12-07
Payer: COMMERCIAL

## 2020-12-07 VITALS
HEART RATE: 82 BPM | DIASTOLIC BLOOD PRESSURE: 84 MMHG | TEMPERATURE: 97.2 F | BODY MASS INDEX: 25.76 KG/M2 | SYSTOLIC BLOOD PRESSURE: 142 MMHG | WEIGHT: 170 LBS | HEIGHT: 68 IN

## 2020-12-07 DIAGNOSIS — E11.42 DIABETIC POLYNEUROPATHY ASSOCIATED WITH TYPE 2 DIABETES MELLITUS (HCC): ICD-10-CM

## 2020-12-07 DIAGNOSIS — M14.672 CHARCOT'S JOINT OF LEFT FOOT: Primary | ICD-10-CM

## 2020-12-07 DIAGNOSIS — Z86.31 PERSONAL HISTORY OF DIABETIC FOOT ULCER: ICD-10-CM

## 2020-12-07 PROCEDURE — 99213 OFFICE O/P EST LOW 20 MIN: CPT | Performed by: PODIATRIST

## 2020-12-30 ENCOUNTER — NURSE TRIAGE (OUTPATIENT)
Dept: OTHER | Facility: OTHER | Age: 60
End: 2020-12-30

## 2021-01-14 ENCOUNTER — IMMUNIZATIONS (OUTPATIENT)
Dept: FAMILY MEDICINE CLINIC | Facility: HOSPITAL | Age: 61
End: 2021-01-14

## 2021-01-14 DIAGNOSIS — Z23 ENCOUNTER FOR IMMUNIZATION: Primary | ICD-10-CM

## 2021-01-14 PROCEDURE — 91301 SARS-COV-2 / COVID-19 MRNA VACCINE (MODERNA) 100 MCG: CPT

## 2021-01-14 PROCEDURE — 0011A SARS-COV-2 / COVID-19 MRNA VACCINE (MODERNA) 100 MCG: CPT

## 2021-01-25 ENCOUNTER — OFFICE VISIT (OUTPATIENT)
Dept: PODIATRY | Facility: CLINIC | Age: 61
End: 2021-01-25
Payer: COMMERCIAL

## 2021-01-25 VITALS
DIASTOLIC BLOOD PRESSURE: 68 MMHG | WEIGHT: 170 LBS | SYSTOLIC BLOOD PRESSURE: 126 MMHG | BODY MASS INDEX: 25.76 KG/M2 | HEIGHT: 68 IN | HEART RATE: 82 BPM

## 2021-01-25 DIAGNOSIS — M14.672 CHARCOT'S JOINT OF LEFT FOOT: Primary | ICD-10-CM

## 2021-01-25 DIAGNOSIS — E11.42 DIABETIC POLYNEUROPATHY ASSOCIATED WITH TYPE 2 DIABETES MELLITUS (HCC): ICD-10-CM

## 2021-01-25 PROCEDURE — 3008F BODY MASS INDEX DOCD: CPT | Performed by: PODIATRIST

## 2021-01-25 PROCEDURE — 99214 OFFICE O/P EST MOD 30 MIN: CPT | Performed by: PODIATRIST

## 2021-01-25 RX ORDER — CALCITONIN SALMON 200 [IU]/.09ML
1 SPRAY, METERED NASAL DAILY
Qty: 3.7 ML | Refills: 1 | Status: SHIPPED | OUTPATIENT
Start: 2021-01-25

## 2021-02-09 ENCOUNTER — IMMUNIZATIONS (OUTPATIENT)
Dept: FAMILY MEDICINE CLINIC | Facility: HOSPITAL | Age: 61
End: 2021-02-09

## 2021-02-09 DIAGNOSIS — Z23 ENCOUNTER FOR IMMUNIZATION: Primary | ICD-10-CM

## 2021-02-09 PROCEDURE — 0012A SARS-COV-2 / COVID-19 MRNA VACCINE (MODERNA) 100 MCG: CPT

## 2021-02-09 PROCEDURE — 91301 SARS-COV-2 / COVID-19 MRNA VACCINE (MODERNA) 100 MCG: CPT

## 2021-02-15 NOTE — PROGRESS NOTES
This patient was seen on 1/25/21  My role is Foot , Ankle, and Wound Specialist    SUBJECTIVE    Chief Complaint:  Acute on Chronic Charcot neuroarthropathy     Patient ID: Christofer Rouse is a 64 y o  female  Juan J Newton presents for her acute Charcot carter  She's wearing her camboot  The following portions of the patient's history were reviewed and updated as appropriate: allergies, current medications, past family history, past medical history, past social history, past surgical history and problem list     Review of Systems   Constitutional: Positive for activity change  Negative for appetite change, chills, diaphoresis, fatigue, fever and unexpected weight change  Respiratory: Negative  Cardiovascular: Negative  Gastrointestinal: Negative  Musculoskeletal: Positive for gait problem and joint swelling  Skin: Negative for wound  Neurological: Positive for numbness  Hematological: Negative  Psychiatric/Behavioral: Negative  OBJECTIVE      /68   Pulse 82   Ht 5' 8" (1 727 m)   Wt 77 1 kg (170 lb)   BMI 25 85 kg/m²     Foot/Ankle Musculoskeletal Exam    General      Neurological: alert      General additional comments: I still note some edema in the foot  No interval architectural changes noted  Bilateral infrared cutaneous thermistor measurements were obtained after allowing his skin to equilibrate to room temperature air for 15 minutes after cast boot and shoe removal  10 sites were tested on the acute Charcot foot and the warmest site was selected for comparison to the contralateral foot  The differential is noted to be 5 6 degrees Fahrenheit  Neurovascular      Neurovascular - Right        Dorsalis pedis: 2+      Posterior tibial: 2+      Neurovascular - Left        Dorsalis pedis: 2+      Posterior tibial: 2+       Physical Exam  Vitals signs and nursing note reviewed  Constitutional:       General: She is not in acute distress       Appearance: She is not ill-appearing, toxic-appearing or diaphoretic  HENT:      Head: Normocephalic  Cardiovascular:      Rate and Rhythm: Normal rate  Pulses: Normal pulses  Dorsalis pedis pulses are 2+ on the right side and 2+ on the left side  Posterior tibial pulses are 2+ on the right side and 2+ on the left side  Pulmonary:      Effort: Pulmonary effort is normal    Abdominal:      General: Bowel sounds are normal    Musculoskeletal:      Comments: I still note some edema in the foot  No interval architectural changes noted  Bilateral infrared cutaneous thermistor measurements were obtained after allowing his skin to equilibrate to room temperature air for 15 minutes after cast boot and shoe removal  10 sites were tested on the acute Charcot foot and the warmest site was selected for comparison to the contralateral foot  The differential is noted to be 5 6 degrees Fahrenheit  Feet:      Right foot:      Protective Sensation: 10 sites tested  0 sites sensed  Left foot:      Protective Sensation: 10 sites tested  0 sites sensed  Skin:     Capillary Refill: Capillary refill takes less than 2 seconds  Neurological:      General: No focal deficit present  Mental Status: She is alert and oriented to person, place, and time  ASSESSMENT     Diagnoses and all orders for this visit:    Charcot's joint of left foot  -     calcitonin, salmon, (MIACALCIN) 200 units/act nasal spray; 1 spray into each nostril daily    Diabetic polyneuropathy associated with type 2 diabetes mellitus ([facility] )         Problem List Items Addressed This Visit        Endocrine    Diabetic neuropathy ([facility] )       Musculoskeletal and Integument    Charcot's joint of left foot - Primary    Relevant Medications    calcitonin, salmon, (MIACALCIN) 200 units/act nasal spray            Problems:    Chronic illness / Problem not at goal with exacerbation, progression or side effects of treatment  1   Acute on Chronic Charcot neuroarthropathy  2  Diabetic Peripheral neuropathy      PLAN    Data Reviewed / Tests Ordered / Procedures Performed / Recommendations:    Discussion of Management /  Recommendations  I told her that I'm concerned that her Charcot is not resolving evidenced by the edema and high unilateral skin temperuatures  I am going to attempt pharmocologic therapy but if we don't see improvement by the next visit may need to consider further offloading modalities  Prescriptions Given  Will have her start salmon calcitonin nasal spray daily  Rx sent  Risk of Complications and/or Morbidity or Mortality  I explained that if left untreated, Charcot can lead to further foot deformity, ulcers and limb loss

## 2021-02-15 NOTE — PATIENT INSTRUCTIONS
Charcot Foot  What Is Charcot Foot? Charcot foot is a condition causing weakening of the bones in the foot that can occur in people who have significant nerve damage (neuropathy)  The bones are weakened enough to fracture, and with continued walking the foot eventually changes shape  As the disorder progresses, the joints collapse and the foot takes on an abnormal shape, such as a rocker-bottom appearance  Charcot foot is a very serious condition that can lead to severe deformity, disability, and even amputation  Because of its seriousness, it is important that patients with diabetes--a disease often associated with neuropathy--take preventive measures and seek immediate care if signs or symptoms appear  [id number]    Causes  Charcot foot develops as a result of neuropathy, which decreases sensation and the ability to feel temperature, pain, or trauma  Because of diminished sensation, the patient may continue to walk--making the injury worse  People with neuropathy (especially those who have had it for a long time) are at risk for developing Charcot foot  In addition, neuropathic patients with a tight Achilles tendon have been shown to have a tendency to develop Charcot foot  Symptoms  The symptoms of Charcot foot may include:    Warmth to the touch (the affected foot feels warmer than the other)  Redness in the foot  Swelling in the area  Pain or soreness  Diagnosis  Early diagnosis of Charcot foot is extremely important for successful treatment  To arrive at a diagnosis, the surgeon will examine the foot and ankle and ask about events that may have occurred prior to the symptoms  X-rays and other imaging studies and tests may be ordered  Once treatment begins, x-rays are taken periodically to aid in evaluating the status of the condition  Non-Surgical Treatment  It is extremely important to follow the surgeons treatment plan for Charcot foot   Failure to do so can lead to the loss of a toe, foot, leg, or life  Non-surgical treatment for Charcot foot consists of:    Immobilization  Because the foot and ankle are so fragile during the early stage of Charcot, they must be protected so the weakened bones can repair themselves  Complete non-weightbearing is necessary to keep the foot from further collapsing  The patient will not be able to walk on the affected foot until the surgeon determines it is safe to do so  During this period, the patient may be fitted with a cast, removable boot, or brace, and may be required to use crutches or a wheelchair  It may take the bones several months to heal, although it can take considerably longer in some patients  Custom shoes and bracing  Shoes with special inserts may be needed after the bones have healed to enable the patient to return to daily activities--as well as help prevent recurrence of Charcot foot, development of ulcers, and possibly amputation  In cases with significant deformity, bracing is also required  Activity modification  A modification in activity level may be needed to avoid repetitive trauma to both feet  A patient with Charcot in one foot is more likely to develop it in the other foot, so measures must be taken to protect both feet  When is Surgery Needed? In some cases, the Charcot deformity may become severe enough that surgery is necessary  The foot and ankle surgeon will determine the proper timing as well as the appropriate procedure for the individual case  Preventive Care  The patient can play a vital role in preventing Charcot foot and its complications by following these measures:    Keeping blood sugar levels under control can help reduce the progression of nerve damage in the feet  Get regular check-ups from a foot and ankle surgeon  Check both feet every day--and see a surgeon immediately if you notice signs of Charcot foot    Be careful to avoid injury, such as bumping the foot or overdoing an exercise program   Follow the surgeons instructions for long-term treatment to prevent recurrences, ulcers, and amputation

## 2021-03-01 ENCOUNTER — OFFICE VISIT (OUTPATIENT)
Dept: PODIATRY | Facility: CLINIC | Age: 61
End: 2021-03-01
Payer: COMMERCIAL

## 2021-03-01 VITALS
BODY MASS INDEX: 27.67 KG/M2 | HEART RATE: 74 BPM | SYSTOLIC BLOOD PRESSURE: 121 MMHG | WEIGHT: 182 LBS | DIASTOLIC BLOOD PRESSURE: 71 MMHG

## 2021-03-01 DIAGNOSIS — M14.672 CHARCOT'S JOINT OF LEFT FOOT: ICD-10-CM

## 2021-03-01 DIAGNOSIS — E11.42 DIABETIC POLYNEUROPATHY ASSOCIATED WITH TYPE 2 DIABETES MELLITUS (HCC): Primary | ICD-10-CM

## 2021-03-01 PROCEDURE — 99214 OFFICE O/P EST MOD 30 MIN: CPT | Performed by: PODIATRIST

## 2021-03-01 NOTE — PROGRESS NOTES
This patient was seen on 3/1/21  My role is Foot , Ankle, and Wound Specialist    SUBJECTIVE    Chief Complaint:  Charcot foot     Patient ID: Eliz Mancini is a 64 y o  female  Eric Velasco presents for her acute Charcot foot  She's wearing her camboot  She's using salmon calcitonin nasal spray  The following portions of the patient's history were reviewed and updated as appropriate: allergies, current medications, past family history, past medical history, past social history, past surgical history and problem list     Review of Systems   Constitutional: Positive for activity change  Negative for appetite change, chills, diaphoresis, fatigue, fever and unexpected weight change  Respiratory: Negative  Cardiovascular: Negative  Gastrointestinal: Negative  Musculoskeletal: Positive for gait problem and joint swelling  Skin: Negative for wound  Neurological: Positive for numbness  Hematological: Negative  Psychiatric/Behavioral: Negative  OBJECTIVE      /71   Pulse 74   Wt 82 6 kg (182 lb) Comment: wearing cam walker  BMI 27 67 kg/m²     Foot/Ankle Musculoskeletal Exam    General      Neurological: alert      General additional comments: I note some deformity of the Left foot (arch loss, some forefoot adductus) that was present prior to the current exacerbation of Charcot  I still note some edema in the foot  No interval architectural changes noted  Bilateral infrared cutaneous thermistor measurements were obtained after allowing his skin to equilibrate to room temperature air for 15 minutes after cast boot and shoe removal  10 sites were tested on the acute Charcot foot and the warmest site was selected for comparison to the contralateral foot  The differential is noted to be 7 5 degrees Fahrenheit      Neurovascular      Neurovascular - Right        Dorsalis pedis: 2+      Posterior tibial: 2+      Neurovascular - Left        Dorsalis pedis: 2+      Posterior tibial: 2+       Physical Exam  Vitals signs and nursing note reviewed  Constitutional:       General: She is not in acute distress  Appearance: She is not ill-appearing, toxic-appearing or diaphoretic  HENT:      Head: Normocephalic  Cardiovascular:      Rate and Rhythm: Normal rate  Pulses: Normal pulses  Dorsalis pedis pulses are 2+ on the right side and 2+ on the left side  Posterior tibial pulses are 2+ on the right side and 2+ on the left side  Pulmonary:      Effort: Pulmonary effort is normal    Abdominal:      General: Bowel sounds are normal    Musculoskeletal:      Comments: I note some deformity of the Left foot (arch loss, some forefoot adductus) that was present prior to the current exacerbation of Charcot  I still note some edema in the foot  No interval architectural changes noted  Bilateral infrared cutaneous thermistor measurements were obtained after allowing his skin to equilibrate to room temperature air for 15 minutes after cast boot and shoe removal  10 sites were tested on the acute Charcot foot and the warmest site was selected for comparison to the contralateral foot  The differential is noted to be 7 5 degrees Fahrenheit  Feet:      Right foot:      Protective Sensation: 10 sites tested  0 sites sensed  Left foot:      Protective Sensation: 10 sites tested  0 sites sensed  Skin:     Capillary Refill: Capillary refill takes less than 2 seconds  Neurological:      General: No focal deficit present  Mental Status: She is alert and oriented to person, place, and time               ASSESSMENT     Diagnoses and all orders for this visit:    Diabetic poly[facility]urop[facility] associated with type 2 [facility]    Charcot's joint of left foot         Problem List Items Addressed This Visit        Endocrine    Diabetic neuropathy ([facility] ) - Primary       Musculoskeletal and Integument    Charcot's joint of left foot Problems:    Chronic illness / Problem not at goal with exacerbation, progression or side effects of treatment  1  Acute Charcot neuroarthropathy    PLAN    I had a long talk with Rajni about her foot  I explained that this episode of acute Charcot has gone on a long time and doesn't seem to be responding to simple offloading (camboot) and the adjunctive calcitonin  Her acute Charcot foot puts her at danger of further foot collapse and makes future foot ulcers, surgery and limb loss more likely  I explained that further actions will be necessary to allow quiscence of the acute Charcot process  Choices given are:    A period of strict NWB on the foot + continued immobilization  (or)    Achilles tendon lengthening  Pros and cons of each were discussed in depth  She is really unwilling to do either right now but is willing to attempt at least part of the day to be NWB (she has knee scooter at home)  She states her front yard and steps into the house preclude being strictly NWB  I wrote a note for her so she may use the scooter at work

## 2021-03-01 NOTE — PATIENT INSTRUCTIONS
Charcot Foot  What Is Charcot Foot? Charcot foot is a condition causing weakening of the bones in the foot that can occur in people who have significant nerve damage (neuropathy)  The bones are weakened enough to fracture, and with continued walking the foot eventually changes shape  As the disorder progresses, the joints collapse and the foot takes on an abnormal shape, such as a rocker-bottom appearance  Charcot foot is a very serious condition that can lead to severe deformity, disability, and even amputation  Because of its seriousness, it is important that patients with diabetes--a disease often associated with neuropathy--take preventive measures and seek immediate care if signs or symptoms appear  [id number]    Causes  Charcot foot develops as a result of neuropathy, which decreases sensation and the ability to feel temperature, pain, or trauma  Because of diminished sensation, the patient may continue to walk--making the injury worse  People with neuropathy (especially those who have had it for a long time) are at risk for developing Charcot foot  In addition, neuropathic patients with a tight Achilles tendon have been shown to have a tendency to develop Charcot foot  Symptoms  The symptoms of Charcot foot may include:    Warmth to the touch (the affected foot feels warmer than the other)  Redness in the foot  Swelling in the area  Pain or soreness  Diagnosis  Early diagnosis of Charcot foot is extremely important for successful treatment  To arrive at a diagnosis, the surgeon will examine the foot and ankle and ask about events that may have occurred prior to the symptoms  X-rays and other imaging studies and tests may be ordered  Once treatment begins, x-rays are taken periodically to aid in evaluating the status of the condition  Non-Surgical Treatment  It is extremely important to follow the surgeons treatment plan for Charcot foot   Failure to do so can lead to the loss of a toe, foot, leg, or life  Non-surgical treatment for Charcot foot consists of:    Immobilization  Because the foot and ankle are so fragile during the early stage of Charcot, they must be protected so the weakened bones can repair themselves  Complete non-weightbearing is necessary to keep the foot from further collapsing  The patient will not be able to walk on the affected foot until the surgeon determines it is safe to do so  During this period, the patient may be fitted with a cast, removable boot, or brace, and may be required to use crutches or a wheelchair  It may take the bones several months to heal, although it can take considerably longer in some patients  Custom shoes and bracing  Shoes with special inserts may be needed after the bones have healed to enable the patient to return to daily activities--as well as help prevent recurrence of Charcot foot, development of ulcers, and possibly amputation  In cases with significant deformity, bracing is also required  Activity modification  A modification in activity level may be needed to avoid repetitive trauma to both feet  A patient with Charcot in one foot is more likely to develop it in the other foot, so measures must be taken to protect both feet  When is Surgery Needed? In some cases, the Charcot deformity may become severe enough that surgery is necessary  The foot and ankle surgeon will determine the proper timing as well as the appropriate procedure for the individual case  Preventive Care  The patient can play a vital role in preventing Charcot foot and its complications by following these measures:    Keeping blood sugar levels under control can help reduce the progression of nerve damage in the feet  Get regular check-ups from a foot and ankle surgeon  Check both feet every day--and see a surgeon immediately if you notice signs of Charcot foot    Be careful to avoid injury, such as bumping the foot or overdoing an exercise program   Follow the surgeons instructions for long-term treatment to prevent recurrences, ulcers, and amputation

## 2021-03-29 ENCOUNTER — OFFICE VISIT (OUTPATIENT)
Dept: PODIATRY | Facility: CLINIC | Age: 61
End: 2021-03-29
Payer: COMMERCIAL

## 2021-03-29 VITALS — DIASTOLIC BLOOD PRESSURE: 80 MMHG | HEART RATE: 78 BPM | SYSTOLIC BLOOD PRESSURE: 138 MMHG

## 2021-03-29 DIAGNOSIS — E11.42 DIABETIC POLYNEUROPATHY ASSOCIATED WITH TYPE 2 DIABETES MELLITUS (HCC): ICD-10-CM

## 2021-03-29 DIAGNOSIS — M14.672 CHARCOT'S JOINT OF LEFT FOOT: Primary | ICD-10-CM

## 2021-03-29 PROCEDURE — 1036F TOBACCO NON-USER: CPT | Performed by: PODIATRIST

## 2021-03-29 PROCEDURE — 99213 OFFICE O/P EST LOW 20 MIN: CPT | Performed by: PODIATRIST

## 2021-03-29 NOTE — PROGRESS NOTES
This patient was seen on 3/29/21  My role is Foot , Ankle, and Wound Specialist    SUBJECTIVE    Chief Complaint:  Acute neuroarthropathy Left foot     Patient ID: Joellen Cardenas is a 64 y o  female  Kishan Martinez presents for her acute Charcot foot  She's wearing her camboot and states she's been strictly NWB on the foot  She notes a reduction in edema in the foot  She's using salmon calcitonin nasal spray  The following portions of the patient's history were reviewed and updated as appropriate: allergies, current medications, past family history, past medical history, past social history, past surgical history and problem list     Review of Systems   Constitutional: Positive for activity change  Negative for appetite change, chills, diaphoresis, fatigue, fever and unexpected weight change  Respiratory: Negative  Cardiovascular: Negative  Gastrointestinal: Negative  Musculoskeletal: Positive for gait problem and joint swelling  Skin: Negative for wound  Neurological: Positive for numbness  Hematological: Negative  Psychiatric/Behavioral: Negative  OBJECTIVE      /80   Pulse 78     Foot/Ankle Musculoskeletal Exam    General      Neurological: alert      General additional comments: I note some deformity of the Left foot (arch loss, some forefoot adductus) that was present prior to the current exacerbation of Charcot  I note minimal edema in the foot  No interval architectural changes noted  Bilateral infrared cutaneous thermistor measurements were obtained after allowing his skin to equilibrate to room temperature air for 15 minutes after cast boot and shoe removal  10 sites were tested on the acute Charcot foot and the warmest site was selected for comparison to the contralateral foot  The differential is noted to be 0 75 degrees Fahrenheit      Neurovascular      Neurovascular - Right        Dorsalis pedis: 2+      Posterior tibial: 2+      Neurovascular - Left Dorsalis pedis: 2+      Posterior tibial: 2+       Physical Exam  Vitals signs and nursing note reviewed  Constitutional:       General: She is not in acute distress  Appearance: She is not ill-appearing, toxic-appearing or diaphoretic  HENT:      Head: Normocephalic  Cardiovascular:      Rate and Rhythm: Normal rate  Pulses: Normal pulses  Dorsalis pedis pulses are 2+ on the right side and 2+ on the left side  Posterior tibial pulses are 2+ on the right side and 2+ on the left side  Pulmonary:      Effort: Pulmonary effort is normal    Abdominal:      General: Bowel sounds are normal    Musculoskeletal:      Comments: I note some deformity of the Left foot (arch loss, some forefoot adductus) that was present prior to the current exacerbation of Charcot  I note minimal edema in the foot  No interval architectural changes noted  Bilateral infrared cutaneous thermistor measurements were obtained after allowing his skin to equilibrate to room temperature air for 15 minutes after cast boot and shoe removal  10 sites were tested on the acute Charcot foot and the warmest site was selected for comparison to the contralateral foot  The differential is noted to be 0 75 degrees Fahrenheit  Feet:      Right foot:      Protective Sensation: 10 sites tested  0 sites sensed  Left foot:      Protective Sensation: 10 sites tested  0 sites sensed  Skin:     Capillary Refill: Capillary refill takes less than 2 seconds  Neurological:      General: No focal deficit present  Mental Status: She is alert and oriented to person, place, and time               ASSESSMENT     Diagnoses and all orders for this visit:    Charcot's joint of left foot    Diabetic polyneuropathy associated with type 2 diabetes mellitus ([facility] )         Problem List Items Addressed This Visit        Endocrine    Diabetic neuropathy ([facility] )       Musculoskeletal and Integument    Charcot's joint of left foot - Primary            Problems:    Stable Chronic Problem Addressed:  1  Diabetic peripheral neuropathy  2  Sub-acute neuroarthropathy Left foot    PLAN    I recommend she continue use of the camboot  I recommend continue NWB x 1 week; following by a gradual re-introduction of WB in the boot  High risk  foot precautions reviewed with patient including the need to wear protective shoegear at all times when walking including in the home, the need to check feet all surfaces daily with a mirror and report and skin breaks to podiatry, the need to apply an emollient to skin of feet daily  I discussed preventative measures in light of the Charcot foot such as avoidance of walking barefoot or in an unsupported shoe, the need to wear custom insoles and rigid carbon fiber foot plate at all times, the need to replace the shoes yearly and the soft insoles every 4 months, to avoid lifting objects heavier than 20 pounds and to also avoid focal stress on the midfoot such as walking on rung ladders, using a spade shovel, etc  The signs and symptoms of acute Charcot were reviewed as well (edema, warmth, redness or bone pain/aching in the foot or progressive changes in the shape of the foot or arch height)  Follow-up 3 weeks

## 2021-03-29 NOTE — PATIENT INSTRUCTIONS
Foot Care for People with Diabetes   WHAT YOU NEED TO KNOW:   · Foot care helps protect your feet and prevent foot ulcers or sores  Long-term high blood sugar levels can damage the blood vessels and nerves in your legs and feet  This damage makes it hard to feel pressure, pain, temperature, and touch  You may not be able to feel a cut or sore, or shoes that are too tight  Foot care is needed to prevent serious problems, such as an infection or amputation  · Diabetes may cause your toes to become crooked or curved under  These changes may affect the way you walk and can lead to increased pressure on your foot  The pressure can decrease blood flow to your feet  Lack of blood flow increases your risk for a foot ulcer  Do not ignore small problems, such as dry skin or small wounds  These can become life-threatening over time without proper care  DISCHARGE INSTRUCTIONS:   Call your care team provider if:   · Your feet become numb, weak, or hard to move  · You have pus draining from a sore on your foot  · You have a wound on your foot that gets bigger, deeper, or does not heal      · You see blisters, cuts, scratches, calluses, or sores on your foot  · You have a fever, and your feet become red, warm, and swollen  · Your toenails become thick, curled, or yellow  · You find it hard to check your feet because your vision is poor  · You have questions or concerns about your condition or care  Foot care:   · Check your feet each day  Look at your whole foot, including the bottom, and between and under your toes  Check for wounds, corns, and calluses  Use a mirror to see the bottom of your feet  The skin on your feet may be shiny, tight, or darker than normal  Your feet may also be cold and pale  Feel your feet by running your hands along the tops, bottoms, sides, and between your toes  Redness, swelling, and warmth are signs of blood flow problems that can lead to a foot ulcer   Do not try to remove corns or calluses yourself  · Wash your feet each day with soap and warm water  Do not use hot water, because this can injure your foot  Dry your feet gently with a towel after you wash them  Dry between and under your toes  · Apply lotion or a moisturizer on your dry feet  Ask your care team provider what lotions are best to use  Do not put lotion or moisturizer between your toes  Moisture between your toes could lead to skin breakdown  · Cut your toenails correctly  File or cut your toenails straight across  Use a soft brush to clean around your toenails  If your toenails are very thick, you may need to have a care team provider or specialist cut them  · Protect your feet  Do not walk barefoot or wear your shoes without socks  Check your shoes for rocks or other objects that can hurt your feet  Wear cotton socks to help keep your feet dry  Wear socks without toe seams, or wear them with the seams inside out  Change your socks each day  Do not wear socks that are dirty or damp  · Wear shoes that fit well  Wear shoes that do not rub against any area of your feet  Your shoes should be ½ to ¾ inch (1 to 2 centimeters) longer than your feet  Your shoes should also have extra space around the widest part of your feet  Walking or athletic shoes with laces or straps that adjust are best  Ask your care team provider for help to choose shoes that fit you best  Ask him or her if you need to wear an insert, orthotic, or bandage on your feet  · Do not smoke  Smoking can damage your blood vessels and put you at increased risk for foot ulcers  Ask your care team provider for information if you currently smoke and need help to quit  E-cigarettes or smokeless tobacco still contain nicotine  Talk to your care team provider before you use these products  Follow up with your diabetes care team provider or foot specialist as directed:   You will need to have your feet checked at least once a oleg [address] may need a foot exam more often if you have nerve damage, foot deformities, or ulcers  Write down your questions so you remember to ask them during your visits  © Copyright [address] Information is for End User's use only and may not be sold, redistributed or otherwise used for commercial purposes  All illustrations and images included in CareNotes® are the copyrighted property of [employer]  or [facility] Steph Tripathi   The above information is an  only  It is not intended as medical advice for individual conditions or treatments  Talk to your doctor, nurse or pharmacist before following any medical regimen to see if it is safe and effective for you

## 2021-04-26 ENCOUNTER — OFFICE VISIT (OUTPATIENT)
Dept: PODIATRY | Facility: CLINIC | Age: 61
End: 2021-04-26
Payer: COMMERCIAL

## 2021-04-26 VITALS
SYSTOLIC BLOOD PRESSURE: 156 MMHG | BODY MASS INDEX: 27.52 KG/M2 | WEIGHT: 181 LBS | HEART RATE: 84 BPM | DIASTOLIC BLOOD PRESSURE: 80 MMHG

## 2021-04-26 DIAGNOSIS — L97.521 ULCER OF TOE OF LEFT FOOT, LIMITED TO BREAKDOWN OF SKIN (HCC): ICD-10-CM

## 2021-04-26 DIAGNOSIS — S92.425D CLOSED NONDISPLACED FRACTURE OF DISTAL PHALANX OF LEFT GREAT TOE WITH ROUTINE HEALING: ICD-10-CM

## 2021-04-26 DIAGNOSIS — M14.672 CHARCOT'S JOINT OF LEFT FOOT: Primary | ICD-10-CM

## 2021-04-26 DIAGNOSIS — E11.42 DIABETIC POLYNEUROPATHY ASSOCIATED WITH TYPE 2 DIABETES MELLITUS (HCC): ICD-10-CM

## 2021-04-26 PROCEDURE — 97597 DBRDMT OPN WND 1ST 20 CM/<: CPT | Performed by: PODIATRIST

## 2021-04-26 PROCEDURE — 99213 OFFICE O/P EST LOW 20 MIN: CPT | Performed by: PODIATRIST

## 2021-04-29 ENCOUNTER — HOSPITAL ENCOUNTER (EMERGENCY)
Facility: HOSPITAL | Age: 61
Discharge: HOME | End: 2021-04-29
Attending: EMERGENCY MEDICINE
Payer: COMMERCIAL

## 2021-04-29 ENCOUNTER — APPOINTMENT (EMERGENCY)
Dept: RADIOLOGY | Facility: HOSPITAL | Age: 61
End: 2021-04-29
Payer: COMMERCIAL

## 2021-04-29 VITALS
WEIGHT: 178.13 LBS | HEART RATE: 98 BPM | DIASTOLIC BLOOD PRESSURE: 92 MMHG | TEMPERATURE: 98.6 F | SYSTOLIC BLOOD PRESSURE: 148 MMHG | BODY MASS INDEX: 27.08 KG/M2 | RESPIRATION RATE: 18 BRPM | OXYGEN SATURATION: 98 %

## 2021-04-29 DIAGNOSIS — L03.032 CELLULITIS OF GREAT TOE OF LEFT FOOT: Primary | ICD-10-CM

## 2021-04-29 LAB
ALBUMIN SERPL BCP-MCNC: 4.5 G/DL (ref 3.5–5)
ALP SERPL-CCNC: 78 U/L (ref 46–116)
ALT SERPL W P-5'-P-CCNC: 31 U/L (ref 12–78)
ANION GAP SERPL CALCULATED.3IONS-SCNC: 10 MMOL/L (ref 4–13)
AST SERPL W P-5'-P-CCNC: 13 U/L (ref 5–45)
BASOPHILS # BLD AUTO: 0.06 THOUSANDS/ΜL (ref 0–0.1)
BASOPHILS NFR BLD AUTO: 1 % (ref 0–1)
BILIRUB SERPL-MCNC: 0.44 MG/DL (ref 0.2–1)
BUN SERPL-MCNC: 14 MG/DL (ref 5–25)
CALCIUM SERPL-MCNC: 10.3 MG/DL (ref 8.3–10.1)
CHLORIDE SERPL-SCNC: 100 MMOL/L (ref 100–108)
CO2 SERPL-SCNC: 29 MMOL/L (ref 21–32)
CREAT SERPL-MCNC: 0.71 MG/DL (ref 0.6–1.3)
CRP SERPL QL: 58.3 MG/L
EOSINOPHIL # BLD AUTO: 0.15 THOUSAND/ΜL (ref 0–0.61)
EOSINOPHIL NFR BLD AUTO: 2 % (ref 0–6)
ERYTHROCYTE [DISTWIDTH] IN BLOOD BY AUTOMATED COUNT: 12.1 % (ref 11.6–15.1)
ERYTHROCYTE [SEDIMENTATION RATE] IN BLOOD: 45 MM/HOUR (ref 0–29)
GFR SERPL CREATININE-BSD FRML MDRD: 94 ML/MIN/1.73SQ M
GLUCOSE SERPL-MCNC: 214 MG/DL (ref 65–140)
GLUCOSE SERPL-MCNC: 224 MG/DL (ref 65–140)
HCT VFR BLD AUTO: 42 % (ref 34.8–46.1)
HGB BLD-MCNC: 14.1 G/DL (ref 11.5–15.4)
IMM GRANULOCYTES # BLD AUTO: 0.03 THOUSAND/UL (ref 0–0.2)
IMM GRANULOCYTES NFR BLD AUTO: 0 % (ref 0–2)
LYMPHOCYTES # BLD AUTO: 1.62 THOUSANDS/ΜL (ref 0.6–4.47)
LYMPHOCYTES NFR BLD AUTO: 20 % (ref 14–44)
MCH RBC QN AUTO: 31 PG (ref 26.8–34.3)
MCHC RBC AUTO-ENTMCNC: 33.6 G/DL (ref 31.4–37.4)
MCV RBC AUTO: 92 FL (ref 82–98)
MONOCYTES # BLD AUTO: 0.56 THOUSAND/ΜL (ref 0.17–1.22)
MONOCYTES NFR BLD AUTO: 7 % (ref 4–12)
NEUTROPHILS # BLD AUTO: 5.83 THOUSANDS/ΜL (ref 1.85–7.62)
NEUTS SEG NFR BLD AUTO: 70 % (ref 43–75)
NRBC BLD AUTO-RTO: 0 /100 WBCS
PLATELET # BLD AUTO: 288 THOUSANDS/UL (ref 149–390)
PMV BLD AUTO: 9.2 FL (ref 8.9–12.7)
POTASSIUM SERPL-SCNC: 3.9 MMOL/L (ref 3.5–5.3)
PROT SERPL-MCNC: 8.5 G/DL (ref 6.4–8.2)
RBC # BLD AUTO: 4.55 MILLION/UL (ref 3.81–5.12)
SODIUM SERPL-SCNC: 139 MMOL/L (ref 136–145)
WBC # BLD AUTO: 8.25 THOUSAND/UL (ref 4.31–10.16)

## 2021-04-29 PROCEDURE — 99284 EMERGENCY DEPT VISIT MOD MDM: CPT

## 2021-04-29 PROCEDURE — 99285 EMERGENCY DEPT VISIT HI MDM: CPT | Performed by: EMERGENCY MEDICINE

## 2021-04-29 PROCEDURE — 82948 REAGENT STRIP/BLOOD GLUCOSE: CPT

## 2021-04-29 PROCEDURE — 85652 RBC SED RATE AUTOMATED: CPT | Performed by: EMERGENCY MEDICINE

## 2021-04-29 PROCEDURE — 99244 OFF/OP CNSLTJ NEW/EST MOD 40: CPT | Performed by: PODIATRIST

## 2021-04-29 PROCEDURE — 73630 X-RAY EXAM OF FOOT: CPT

## 2021-04-29 PROCEDURE — 85025 COMPLETE CBC W/AUTO DIFF WBC: CPT | Performed by: EMERGENCY MEDICINE

## 2021-04-29 PROCEDURE — 86140 C-REACTIVE PROTEIN: CPT | Performed by: EMERGENCY MEDICINE

## 2021-04-29 PROCEDURE — 80053 COMPREHEN METABOLIC PANEL: CPT | Performed by: EMERGENCY MEDICINE

## 2021-04-29 PROCEDURE — 36415 COLL VENOUS BLD VENIPUNCTURE: CPT | Performed by: EMERGENCY MEDICINE

## 2021-04-29 RX ORDER — DOXYCYCLINE HYCLATE 100 MG/1
100 CAPSULE ORAL ONCE
Status: COMPLETED | OUTPATIENT
Start: 2021-04-29 | End: 2021-04-29

## 2021-04-29 RX ORDER — DOXYCYCLINE HYCLATE 100 MG/1
100 CAPSULE ORAL EVERY 12 HOURS SCHEDULED
Qty: 14 CAPSULE | Refills: 0 | Status: SHIPPED | OUTPATIENT
Start: 2021-04-29 | End: 2021-05-06

## 2021-04-29 RX ADMIN — DOXYCYCLINE 100 MG: 100 CAPSULE ORAL at 20:51

## 2021-04-29 NOTE — ED PROVIDER NOTES
History  Chief Complaint   Patient presents with    Foot Pain     Pt reports surgical shoe that knocked toe nail on left great toe loose  Pt reports "I think its infected" Pt reports hx diabetes  History provided by:  Patient   used: No    Medical Problem  Quality:  Left toe erythema  Severity:  Moderate  Onset quality:  Gradual  Duration:  1 day  Timing:  Constant  Progression:  Unchanged  Chronicity:  New  Context:  Patient recently saw her Podiatrist, put in Shoe, noticed redness over the left 1st toe, called Podiatrist office, advised to come to ER  Relieved by:  Nothing  Worsened by:  None  Ineffective treatments:  None  Associated symptoms: no abdominal pain, no chest pain, no cough, no diarrhea, no fever, no headaches, no loss of consciousness, no nausea, no rash, no shortness of breath, no sore throat and no vomiting    Risk factors:  Charcot feet, DM      Prior to Admission Medications   Prescriptions Last Dose Informant Patient Reported? Taking?   calcitonin, salmon, (MIACALCIN) 200 units/act nasal spray   No Yes   Si spray into each nostril daily   losartan (COZAAR) 25 mg tablet   Yes Yes   Sig: Take by mouth   sertraline (ZOLOFT) 50 mg tablet   Yes Yes   Sig: Take 50 mg by mouth daily   simvastatin (ZOCOR) 5 MG tablet   Yes Yes      Facility-Administered Medications: None       Past Medical History:   Diagnosis Date    Charcot ankle     Diabetes mellitus ([address] )        History reviewed  No pertinent surgical history  Family History   Problem Relation Age of Onset    Hypotension Mother      I have reviewed and agree with the history as documented      E-Cigarette/Vaping    E-Cigarette Use Never User      E-Cigarette/Vaping Substances    Nicotine No     THC No     CBD No     Flavoring No     Other No     Unknown No      Social History     Tobacco Use    Smoking status: Never Smoker    Smokeless tobacco: Never Used   Substance Use Topics    Alcohol use: Never Frequency: Never    Drug use: Never       Review of Systems   Constitutional: Negative for chills and fever  HENT: Negative for facial swelling, sore throat and trouble swallowing  Eyes: Negative for pain and visual disturbance  Respiratory: Negative for cough and shortness of breath  Cardiovascular: Negative for chest pain and leg swelling  Gastrointestinal: Negative for abdominal pain, diarrhea, nausea and vomiting  Genitourinary: Negative for dysuria and flank pain  Musculoskeletal: Negative for back pain, neck pain and neck stiffness  Left 1st toe erythema   Skin: Negative for pallor and rash  Allergic/Immunologic: Negative for environmental allergies and immunocompromised state  Neurological: Negative for dizziness, loss of consciousness and headaches  Hematological: Negative for adenopathy  Does not bruise/bleed easily  Psychiatric/Behavioral: Negative for agitation and behavioral problems  All other systems reviewed and are negative  Physical Exam  Physical Exam  Vitals signs and nursing note reviewed  Constitutional:       General: She is not in acute distress  Appearance: She is well-developed  HENT:      Head: Normocephalic and atraumatic  Eyes:      Extraocular Movements: Extraocular movements intact  Neck:      Musculoskeletal: Normal range of motion and neck supple  Cardiovascular:      Rate and Rhythm: Normal rate and regular rhythm  Pulmonary:      Effort: Pulmonary effort is normal    Abdominal:      Palpations: Abdomen is soft  Tenderness: There is no abdominal tenderness  There is no guarding or rebound  Musculoskeletal:      Comments: Erythema involving distal 1st left toe   Skin:     General: Skin is warm and dry  Neurological:      General: No focal deficit present  Mental Status: She is alert and oriented to person, place, and time     Psychiatric:         Mood and Affect: Mood normal          Behavior: Behavior normal  Vital Signs  ED Triage Vitals [04/29/21 1608]   Temperature Pulse Respirations Blood Pressure SpO2   98 6 °F (37 °C) 102 18 169/94 99 %      Temp Source Heart Rate Source Patient Position - Orthostatic VS BP Location FiO2 (%)   Oral Monitor Sitting Right arm --      Pain Score       No Pain           Vitals:    04/29/21 1608 04/29/21 1914   BP: 169/94 148/92   Pulse: 102 98   Patient Position - Orthostatic VS: Sitting Sitting         Visual Acuity      ED Medications  Medications   doxycycline hyclate (VIBRAMYCIN) capsule 100 mg (100 mg Oral Given 4/29/21 2051)       Diagnostic Studies  Results Reviewed     Procedure Component Value Units Date/Time    C-reactive protein [[id number]]  (Abnormal) Collected: 04/29/21 1823    Lab Status: Final result Specimen: Blood from Arm, Left Updated: 04/29/21 1952     CRP 58 3 mg/L     Sedimentation rate, automated [[id number]]  (Abnormal) Collected: 04/29/21 1823    Lab Status: Final result Specimen: Blood from Arm, Left Updated: 04/29/21 1917     Sed Rate 45 mm/hour     Comprehensive metabolic panel [[id number]]  (Abnormal) Collected: 04/29/21 1823    Lab Status: Final result Specimen: Blood from Arm, Left Updated: 04/29/21 1917     Sodium 139 mmol/L      Potassium 3 9 mmol/L      Chloride 100 mmol/L      CO2 29 mmol/L      ANION GAP 10 mmol/L      BUN 14 mg/dL      Creatinine 0 71 mg/dL      Glucose 224 mg/dL      Calcium 10 3 mg/dL      AST 13 U/L      ALT 31 U/L      Alkaline Phosphatase 78 U/L      Total Protein 8 5 g/dL      Albumin 4 5 g/dL      Total Bilirubin 0 44 mg/dL      eGFR 94 ml/min/1 73sq m     Narrative:      [place] guidelines for Chronic Kidney Disease (CKD):     Stage 1 with normal or high GFR (GFR > 90 mL/min/1 73 square meters)    Stage 2 Mild CKD (GFR = 60-89 mL/min/1 73 square meters)    Stage 3A Moderate CKD (GFR = 45-59 mL/min/1 73 square meters)    Stage 3B Moderate CKD (GFR = 30-44 mL/min/1 73 square meters)   Stage 4 Severe CKD (GFR = 15-29 mL/min/1 73 square meters)    Stage 5 End Stage CKD (GFR <15 mL/min/1 73 square meters)  Note: GFR calculation is accurate only with a steady state creatinine    CBC and differential [[id number]] Collected: 04/29/21 1823    Lab Status: Final result Specimen: Blood from Arm, Left Updated: 04/29/21 1851     WBC 8 25 Thousand/uL      RBC 4 55 Million/uL      Hemoglobin 14 1 g/dL      Hematocrit 42 0 %      MCV 92 fL      MCH 31 0 pg      MCHC 33 6 g/dL      RDW 12 1 %      MPV 9 2 fL      Platelets 516 Thousands/uL      nRBC 0 /100 WBCs      Neutrophils Relative 70 %      Immat GRANS % 0 %      Lymphocytes Relative 20 %      Monocytes Relative 7 %      Eosinophils Relative 2 %      Basophils Relative 1 %      Neutrophils Absolute 5 83 Thousands/µL      Immature Grans Absolute 0 03 Thousand/uL      Lymphocytes Absolute 1 62 Thousands/µL      Monocytes Absolute 0 56 Thousand/µL      Eosinophils Absolute 0 15 Thousand/µL      Basophils Absolute 0 06 Thousands/µL     Fingerstick Glucose (POCT) [[id number]]  (Abnormal) Collected: 04/29/21 1757    Lab Status: Final result Updated: 04/29/21 1759     POC Glucose 214 mg/dl                  XR foot 3+ views LEFT   Final Result by Km Gallardo DO (04/29 2047)      Comminuted fracture 1st distal phalangeal base  Charcot foot  I personally discussed this study with Arcadio Juárez on 4/29/2021 at 8:44 PM                Workstation performed: [id number]                    Procedures  Procedures         ED Course  ED Course as of Apr 30 0136   u Apr 29, 2021   1758 Podiatry informed, workup ordered, will evaluate  1853 WBC: 8 25   1853 Hemoglobin: 14 1   1853 CBC wnl  Platelet Count: 290   1932 Sodium: 139   1932 Potassium: 3 9   1932 BUN: 14   1932 Creatinine: 0 71   1932 CMP, ESR noted  Sed Rate(!): 45   2019 Patient seen by Podiatry, okay to discharge on doxycycline, prescription sent by Podiatry  MDM  Number of Diagnoses or Management Options  Cellulitis of great toe of left foot: new and requires workup  Diagnosis management comments: Impression:  Left 1st toe cellulitis, rule out deep infection, will check labs, x-ray, pictures taken, consult Podiatry  Amount and/or Complexity of Data Reviewed  Clinical lab tests: reviewed and ordered  Tests in the radiology section of CPT®: ordered and reviewed  Tests in the medicine section of CPT®: ordered and reviewed  Discuss the patient with other providers: yes  Independent visualization of images, tracings, or specimens: yes        Disposition  Final diagnoses:   Cellulitis of great toe of left foot     Time reflects when diagnosis was documented in both MDM as applicable and the Disposition within this note     Time User Action Codes Description Comment    4/29/2021  6:02 PM Art Taylor Add [G15 [id number]] Cellulitis of great toe of left foot       ED Disposition     ED Disposition Condition Date/Time Comment    Discharge Stable u Apr 29, 2021  8:20  [address] discharge to home/self care  Follow-up Information     Follow up With Specialties Details Why Contact Info    Toy Nguyen DPM Podiatry, Wound Care Schedule an appointment as soon as possible for a visit   [address] [place] [address]            Discharge Medication List as of 4/29/2021  8:20 PM      CONTINUE these medications which have NOT CHANGED    Details   calcitonin, salmon, (MIACALCIN) 200 units/act nasal spray 1 spray into each nostril daily, Starting Mon 1/25/2021, Normal      losartan (COZAAR) 25 mg tablet Take by mouth, Historical Med      sertraline (ZOLOFT) 50 mg tablet Take 50 mg by mouth daily, Historical Med      simvastatin (ZOCOR) 5 MG tablet Starting Thu 12/19/2019, Historical Med           No discharge procedures on file      PDMP Review     None          ED Provider  Electronically Signed by           Luly Valle MD  04/30/21 3751

## 2021-04-30 NOTE — CONSULTS
Podiatry - Consultation    Patient Information:   [place] 64 y o  female MRN: [id number]  Unit/Bed#: V6H8 Encounter: [id number]  PCP: Jaimie Pan MD  Date of Admission:  4/29/2021  Date of Consultation: 04/29/21  Requesting Physician: Sebastian Ng MD      ASSESSMENT:    [place] is a 64 y o  female with:    1  Left hallux erythema and edema  2  Left foot Charcot neuroarthropathy  3  Type 2 diabetes    PLAN:    · Moderate amount of left hallux erythema and edema with small ecchymotic area at the proximal valeriy-ungual skin  No ascending erythema, no fluctuance, crepitus, or bogginess noted  No drainage or malodor seen  Left hallux toenail slightly loose, however is overall well adhered with no subungual hematoma noted  · X-ray reviewed by myself: Intra-articular fracture noted to the base of the distal phalanx  No other acute osseous abnormalities to suggest osteomyelitis  Significant ankle and midfoot Charcot neuroarthropathy noted  · No leukocytosis, VSS, no systemic signs of infection  ESR and CRP elevated, however the patient does have a history significant for Charcot neuroarthropathy  · Patient stable for discharge on one-week course of doxycycline, recommend close outpatient follow-up with Dr Brenardino Garcia  · The patient was instructed to only wear her CAM walker during ambulation, she was instructed to monitor her left foot daily and was educated on signs and symptoms of a worsening infection  · Plan of care was discussed with my attending  Weightbearing status: WBAT in CAM walker only  SUBJECTIVE:    History of Present Illness:    [place] is a 64 y o  female who is originally seen in the [facility] ED 4/29/2021 due to left great toe swelling and redness  Patient has a past medical history of T2DM and charcot neuroarthropathy  We are consulted for evaluation of the patient's left hallux   The patient states that for about the past week she has been ambulating in a "diabetic shoe" rather than her CAM boot  She states that one day ago she felt a small pain in her left great toe and noticed that it was red, swollen, with "bloody oozing" exuding from the proximal nail border  The patient believes this was form rubbing up against the inside of her shoe  She states that her left hallux is currently in 1/10 pain  She denies attempting any home treatment for this and decided it would be best to come to the ED  Review of Systems:    Constitutional: Negative  HENT: Negative  Eyes: Negative  Respiratory: Negative  Cardiovascular: Negative  Gastrointestinal: Negative  Musculoskeletal: L hallux pain, Charcot neuroarthropathy   Skin:L hallux erythema and edema   Neurological: peripheral neuropathy   Psych: Negative  Past Medical and Surgical History:     Past Medical History:   Diagnosis Date    Charcot ankle     [facility] )        History reviewed  No pertinent surgical history  Meds/Allergies:    (Not in a hospital admission)      Allergies   Allergen Reactions    Cefaclor     Metformin Diarrhea    Penicillins        Social History:     Marital Status: /Civil Union    Substance Use History:   Social History     Substance and Sexual Activity   Alcohol Use Never    Frequency: Never     Social History     Tobacco Use   Smoking Status Never Smoker   Smokeless Tobacco Never Used     Social History     Substance and Sexual Activity   Drug Use Never       Family History:    Family History   Problem Relation Age of Onset    Hypotension Mother          OBJECTIVE:    Vitals:   Blood Pressure: 148/92 (04/29/21 1914)  Pulse: 98 (04/29/21 1914)  Temperature: 98 6 °F (37 °C) (04/29/21 1608)  Temp Source: Oral (04/29/21 1608)  Respirations: 18 (04/29/21 1914)  Weight - Scale: 80 8 kg (178 lb 2 1 oz) (04/29/21 1608)  SpO2: 98 % (04/29/21 1914)    Physical Exam:    General Appearance: Alert, cooperative, no distress    HEENT: Head normocephalic, atraumatic, without obvious abnormality  Heart: Normal rate and rhythm  Lungs: Non-labored breathing  No respiratory distress  Abdomen: Without distension  Psychiatric: AAOx3  Lower Extremity:  Vascular:   Right DP and PT pulses are present  Left DP and PT pulses are present  CRT < 3 seconds at the digits  +0/4 edema noted at bilateral lower extremities  Pedal hair is present  Skin temperature is warm to the right foot in comparison to the left    Musculoskeletal:  MMT is 5/5 in all muscle compartments bilaterally  ROM at the 1st MPJ and ankle joint are decreased bilaterally with the leg extended  No Pain on palpation of the left hallux  Dermatological:   Moderate amount of left hallux erythema and edema with small  ecchymotic area at the proximal valeriy-ungual skin  No ascending  erythema, no fluctuance, crepitus, or bogginess noted  No drainage  or malodor seen  Left hallux toenail slightly loose, however is overall  well adhered with no subungual hematoma noted  Also of note,  hyperkeratotic lesion noted to the left lateral 5th metatarsal base  Neurological:  Gross sensation is diminished  Protective sensation is absent  Patient Reports numbness and/or paresthesias  Clinical Images 04/29/21:                  Additional data:     Lab Results: I have personally reviewed pertinent labs including:    Results from last 7 days   Lab Units 04/29/21  1823   WBC Thousand/uL 8 25   HEMOGLOBIN g/dL 14 1   HEMATOCRIT % 42 0   PLATELETS Thousands/uL 288   NEUTROS PCT % 70   LYMPHS PCT % 20   MONOS PCT % 7   EOS PCT % 2     Results from last 7 days   Lab Units 04/29/21  1823   POTASSIUM mmol/L 3 9   CHLORIDE mmol/L 100   CO2 mmol/L 29   BUN mg/dL 14   CREATININE mg/dL 0 71   CALCIUM mg/dL 10 3*   ALK PHOS U/L 78   ALT U/L 31   AST U/L 13           Cultures: I have personally reviewed pertinent cultures including:              Imaging: I have personally reviewed pertinent reports in PACS    EKG, Pathology, and Other Studies: I have personally reviewed pertinent reports  ** Please Note: Portions of the record may have been created with voice recognition software  Occasional wrong word or "sound a like" substitutions may have occurred due to the inherent limitations of voice recognition software  Read the chart carefully and recognize, using context, where substitutions have occurred   **

## 2021-05-03 ENCOUNTER — OFFICE VISIT (OUTPATIENT)
Dept: PODIATRY | Facility: CLINIC | Age: 61
End: 2021-05-03
Payer: COMMERCIAL

## 2021-05-03 VITALS
DIASTOLIC BLOOD PRESSURE: 83 MMHG | SYSTOLIC BLOOD PRESSURE: 158 MMHG | HEART RATE: 88 BPM | WEIGHT: 178 LBS | BODY MASS INDEX: 27.06 KG/M2

## 2021-05-03 DIAGNOSIS — M14.672 CHARCOT'S JOINT OF LEFT FOOT: ICD-10-CM

## 2021-05-03 DIAGNOSIS — S92.425D CLOSED NONDISPLACED FRACTURE OF DISTAL PHALANX OF LEFT GREAT TOE WITH ROUTINE HEALING: Primary | ICD-10-CM

## 2021-05-03 DIAGNOSIS — E11.42 DIABETIC POLYNEUROPATHY ASSOCIATED WITH TYPE 2 DIABETES MELLITUS (HCC): ICD-10-CM

## 2021-05-03 DIAGNOSIS — L03.032 CELLULITIS OF LEFT TOE: ICD-10-CM

## 2021-05-03 PROCEDURE — 99214 OFFICE O/P EST MOD 30 MIN: CPT | Performed by: PODIATRIST

## 2021-05-03 PROCEDURE — 1036F TOBACCO NON-USER: CPT | Performed by: PODIATRIST

## 2021-05-03 NOTE — PROGRESS NOTES
This patient was seen on 5/3/21  My role is Foot , Ankle, and Wound Specialist    SUBJECTIVE    Chief Complaint:  Cellulitis Left hallux     Patient ID: Sana Hayden is a 64 y o  female  Rajni did attempt to transition from her camboot to sneakers last week but got progress tenderness and bleeding in her Left great toe which resulted in an ER visit and oral antibiotics over the weekend  She notes great reduction in redness since starting the antibiotics and she's transitioned back to the camboot  She notes no continued bleeding from the toenail  The following portions of the patient's history were reviewed and updated as appropriate: allergies, current medications, past family history, past medical history, past social history, past surgical history and problem list     Review of Systems   Constitutional: Negative  Respiratory: Negative  Skin: Positive for wound  Neurological: Positive for numbness  OBJECTIVE      /83   Pulse 88   Wt 80 7 kg (178 lb) Comment: verbal  BMI 27 06 kg/m²     Foot/Ankle Musculoskeletal Exam    General      Neurological: alert      General additional comments: I note some deformity of the Left foot (arch loss, some forefoot adductus) that was present prior to the current exacerbation of Charcot  I note minimal edema in the foot  No interval architectural changes noted  Bilateral infrared cutaneous thermistor measurements were obtained after allowing his skin to equilibrate to room temperature air for 15 minutes after cast boot and shoe removal  10 sites were tested on the acute Charcot foot and the warmest site was selected for comparison to the contralateral foot  The differential is noted to be 0 75 degrees Fahrenheit      Neurovascular      Neurovascular - Right        Dorsalis pedis: 2+      Posterior tibial: 2+      Neurovascular - Left        Dorsalis pedis: 2+      Posterior tibial: 2+       Physical Exam  Vitals signs and nursing note reviewed  Constitutional:       General: She is not in acute distress  Appearance: Normal appearance  She is not ill-appearing, toxic-appearing or diaphoretic  HENT:      Head: Normocephalic  Cardiovascular:      Rate and Rhythm: Normal rate  Pulses: Normal pulses  Dorsalis pedis pulses are 2+ on the right side and 2+ on the left side  Posterior tibial pulses are 2+ on the right side and 2+ on the left side  Pulmonary:      Effort: Pulmonary effort is normal    Abdominal:      General: Bowel sounds are normal    Musculoskeletal:      Comments: I note some deformity of the Left foot (arch loss, some forefoot adductus) that was present prior to the current exacerbation of Charcot  I note minimal edema in the foot  No interval architectural changes noted  Bilateral infrared cutaneous thermistor measurements were obtained after allowing his skin to equilibrate to room temperature air for 15 minutes after cast boot and shoe removal  10 sites were tested on the acute Charcot foot and the warmest site was selected for comparison to the contralateral foot  The differential is noted to be 0 75 degrees Fahrenheit  Feet:      Right foot:      Protective Sensation: 10 sites tested  0 sites sensed  Left foot:      Protective Sensation: 10 sites tested  0 sites sensed  Skin:     Capillary Refill: Capillary refill takes less than 2 seconds  Comments: I note her Left hallux nail has some dried blood at the posterior nail fold  The hallux has very slight residual erythema / edema  The nail plate is solid, not detached  Neurological:      General: No focal deficit present  Mental Status: She is alert and oriented to person, place, and time               ASSESSMENT     Diagnoses and all orders for this visit:    Diabetic polyneuropathy associated with type 2 diabetes mellitus ([facility] )    Charcot's joint of left foot    Cellulitis of left toe    Closed nondisplaced fracture of distal phalanx of left great toe with routine healing         Problem List Items Addressed This Visit        Endocrine    Diabetic neuropathy ([facility] ) - Primary       Musculoskeletal and Integument    Charcot's joint of left foot    Closed nondisplaced fracture of distal phalanx of left great toe with routine healing       Other    Cellulitis of left toe            Problems:    Resolving cellulitis Left hallux  Subacute Charcot Left foot  Diabetic peripheral neuropathy    PLAN    I reviewed Dr Rodney Nissen noted dated 4/29/21  I personally reviewed the xray films dated 4/29/21 noting a fracture of the distal phalanx  This was not present on prior films  I recommend she finish her antibiotics but overall I feel this was inflammation more from a fracture than cellulitis  I suspect she was walking without shoes and traumatized her toe and didn't realize it  She will resume camboot until the fracture is healed

## 2021-05-03 NOTE — PATIENT INSTRUCTIONS
Foot Care for People with Diabetes   WHAT YOU NEED TO KNOW:   · Foot care helps protect your feet and prevent foot ulcers or sores  Long-term high blood sugar levels can damage the blood vessels and nerves in your legs and feet  This damage makes it hard to feel pressure, pain, temperature, and touch  You may not be able to feel a cut or sore, or shoes that are too tight  Foot care is needed to prevent serious problems, such as an infection or amputation  · Diabetes may cause your toes to become crooked or curved under  These changes may affect the way you walk and can lead to increased pressure on your foot  The pressure can decrease blood flow to your feet  Lack of blood flow increases your risk for a foot ulcer  Do not ignore small problems, such as dry skin or small wounds  These can become life-threatening over time without proper care  DISCHARGE INSTRUCTIONS:   Call your care team provider if:   · Your feet become numb, weak, or hard to move  · You have pus draining from a sore on your foot  · You have a wound on your foot that gets bigger, deeper, or does not heal      · You see blisters, cuts, scratches, calluses, or sores on your foot  · You have a fever, and your feet become red, warm, and swollen  · Your toenails become thick, curled, or yellow  · You find it hard to check your feet because your vision is poor  · You have questions or concerns about your condition or care  Foot care:   · Check your feet each day  Look at your whole foot, including the bottom, and between and under your toes  Check for wounds, corns, and calluses  Use a mirror to see the bottom of your feet  The skin on your feet may be shiny, tight, or darker than normal  Your feet may also be cold and pale  Feel your feet by running your hands along the tops, bottoms, sides, and between your toes  Redness, swelling, and warmth are signs of blood flow problems that can lead to a foot ulcer   Do not try to remove corns or calluses yourself  · Wash your feet each day with soap and warm water  Do not use hot water, because this can injure your foot  Dry your feet gently with a towel after you wash them  Dry between and under your toes  · Apply lotion or a moisturizer on your dry feet  Ask your care team provider what lotions are best to use  Do not put lotion or moisturizer between your toes  Moisture between your toes could lead to skin breakdown  · Cut your toenails correctly  File or cut your toenails straight across  Use a soft brush to clean around your toenails  If your toenails are very thick, you may need to have a care team provider or specialist cut them  · Protect your feet  Do not walk barefoot or wear your shoes without socks  Check your shoes for rocks or other objects that can hurt your feet  Wear cotton socks to help keep your feet dry  Wear socks without toe seams, or wear them with the seams inside out  Change your socks each day  Do not wear socks that are dirty or damp  · Wear shoes that fit well  Wear shoes that do not rub against any area of your feet  Your shoes should be ½ to ¾ inch (1 to 2 centimeters) longer than your feet  Your shoes should also have extra space around the widest part of your feet  Walking or athletic shoes with laces or straps that adjust are best  Ask your care team provider for help to choose shoes that fit you best  Ask him or her if you need to wear an insert, orthotic, or bandage on your feet  · Do not smoke  Smoking can damage your blood vessels and put you at increased risk for foot ulcers  Ask your care team provider for information if you currently smoke and need help to quit  E-cigarettes or smokeless tobacco still contain nicotine  Talk to your care team provider before you use these products  Follow up with your diabetes care team provider or foot specialist as directed:   You will need to have your feet checked at least once a oleg Marrerotanika Anderson may need a foot exam more often if you have nerve damage, foot deformities, or ulcers  Write down your questions so you remember to ask them during your visits  © Copyright [address] Information is for End User's use only and may not be sold, redistributed or otherwise used for commercial purposes  All illustrations and images included in CareNotes® are the copyrighted property of [employer]  or [facility] Steph Tripathi   The above information is an  only  It is not intended as medical advice for individual conditions or treatments  Talk to your doctor, nurse or pharmacist before following any medical regimen to see if it is safe and effective for you

## 2021-05-11 PROBLEM — L97.521 ULCER OF TOE OF LEFT FOOT, LIMITED TO BREAKDOWN OF SKIN (HCC): Status: ACTIVE | Noted: 2021-05-11

## 2021-05-11 NOTE — PROGRESS NOTES
This patient was seen on 4/26/21  My role is Foot , Ankle, and Wound Specialist    SUBJECTIVE    Chief Complaint:  Charcot foot Left and new blister     Patient ID: Mago Gamez is a 64 y o  female  Pao Guzmán presents for her acute Charcot foot  She's wearing her camboot and states she's been PWB  She notes a reduction in edema in the foot  She's using salmon calcitonin nasal spray  She notes a new blister on the lateral foot several days ago  The following portions of the patient's history were reviewed and updated as appropriate: allergies, current medications, past family history, past medical history, past social history, past surgical history and problem list     Review of Systems   Constitutional: Positive for activity change  Negative for appetite change, chills, diaphoresis, fatigue, fever and unexpected weight change  Respiratory: Negative  Cardiovascular: Negative  Gastrointestinal: Negative  Musculoskeletal: Positive for gait problem and joint swelling  Skin: Positive for wound  Neurological: Positive for numbness  Hematological: Negative  Psychiatric/Behavioral: Negative  OBJECTIVE      /80   Pulse 84   Wt 82 1 kg (181 lb) Comment: wearing cam walker  BMI 27 52 kg/m²     Foot/Ankle Musculoskeletal Exam    General      Neurological: alert      General additional comments: I note some deformity of the Left foot (arch loss, some forefoot adductus with a prominent styloid process Left) that was present prior to the current exacerbation of Charcot  I note minimal edema in the foot  No interval architectural changes noted  Bilateral infrared cutaneous thermistor measurements were obtained after allowing his skin to equilibrate to room temperature air for 15 minutes after cast boot and shoe removal  10 sites were tested on the acute Charcot foot and the warmest site was selected for comparison to the contralateral foot   The differential is noted to be 3 degrees Fahrenheit  Neurovascular      Neurovascular - Right        Dorsalis pedis: 2+      Posterior tibial: 2+      Neurovascular - Left        Dorsalis pedis: 2+      Posterior tibial: 2+       Physical Exam  Vitals signs and nursing note reviewed  Constitutional:       General: She is not in acute distress  Appearance: She is not ill-appearing, toxic-appearing or diaphoretic  HENT:      Head: Normocephalic  Cardiovascular:      Rate and Rhythm: Normal rate  Pulses: Normal pulses  Dorsalis pedis pulses are 2+ on the right side and 2+ on the left side  Posterior tibial pulses are 2+ on the right side and 2+ on the left side  Pulmonary:      Effort: Pulmonary effort is normal    Abdominal:      General: Bowel sounds are normal    Musculoskeletal:      Comments: I note some deformity of the Left foot (arch loss, some forefoot adductus with a prominent styloid process Left) that was present prior to the current exacerbation of Charcot  I note minimal edema in the foot  No interval architectural changes noted  Bilateral infrared cutaneous thermistor measurements were obtained after allowing his skin to equilibrate to room temperature air for 15 minutes after cast boot and shoe removal  10 sites were tested on the acute Charcot foot and the warmest site was selected for comparison to the contralateral foot  The differential is noted to be 3 degrees Fahrenheit  Feet:      Right foot:      Protective Sensation: 10 sites tested  0 sites sensed  Left foot:      Protective Sensation: 10 sites tested  0 sites sensed  Skin:     Capillary Refill: Capillary refill takes less than 2 seconds  Neurological:      General: No focal deficit present  Mental Status: She is alert and oriented to person, place, and time  I note a partial thickness, non-infected ulcer lateral to the styloid process Left  It's 1 5cm diameter,0 1 cm deep  No probe to bone  ASSESSMENT     Diagnoses and all orders for this visit:    Closed nondisplaced fracture of distal phalanx of left great toe with routine healing    Ulcer of toe of left foot, limited to breakdown of skin ([facility] )    Diabetic polyneuropathy associated with type 2 diabetes mellitus ([facility] )         Problem List Items Addressed This Visit        Endocrine    Diabetic neuropathy ([facility] )       Musculoskeletal and Integument    Closed nondisplaced fracture of distal phalanx of left great toe with routine healing - Primary    Ulcer of toe of left foot, limited to breakdown of skin ([facility] )            Problems:      Chronic illness / Problem not at goal with exacerbation, progression or side effects of treatment  1  Diabetic neuropathy with Charcot  2  Diabetic neuropathy with ulcer    PLAN    The wound needs debridement  Procedure performed: Debridement of dermis/epidermis- < 20 sq cm (38127)     A formal timeout including patient identification, laterality and existing allergies using [facility] protocol was conducted  Debridement is indicated due to devitalized tissue present in open wound bed  Anesthetic used as needed prior to procedure to prevent discomfort  Under aseptic technique,the wound was thoroughly explored for undermining, deep sinus tracts and bone involvement  Sterile instrumentation including scalpel used to excise the clearly delineated devitalized epidermis/dermis tissue to promote wound healing  I thoroughly explored the wound for deep infection or exposure of deep tissues  Minimal, if any, blood loss noted  Patient tolerated debridement without complications  The wound was dressed  Wound dressing technique and frequency described to patient  I am to be called if any signs of infection develop such as erythema, increased wound size or significant change in appearance of wound, odor, pain, increased or change in color of drainage, swelling, fever, chills, nightsweats   I reviewed these signs with the patient  I recommended limiting weight bearing to bathroom priveleges and meal table in an effort to allow proper rest and healing of the wounded area  I explained that good wound care and compliance are necessary to allow healing and to prevent toe loss or limb loss  We both agree the camboot is likely not accommodating her prominent styloid and exacerbating the ulcer  I feel currently I need to prioritize the ulcer over the Charcot  I recommended she resume the diabetic shoes with custom inserts

## 2021-05-11 NOTE — PATIENT INSTRUCTIONS
Debridement   WHAT YOU NEED TO KNOW:   Debridement is the removal of infected, damaged, or dead tissue so a wound can heal properly  You may need more than one debridement  DISCHARGE INSTRUCTIONS:   Medicines:   · Medicines  can help decrease pain or prevent or treat an infection  · Take your medicine as directed  Contact your healthcare provider if you think your medicine is not helping or if you have side effects  Tell him of her if you are allergic to any medicine  Keep a list of the medicines, vitamins, and herbs you take  Include the amounts, and when and why you take them  Bring the list or the pill bottles to follow-up visits  Carry your medicine list with you in case of an emergency  Follow up with your healthcare provider as directed: You may need to return to have your wound checked  Write down your questions so you remember to ask them during your visits  Care for your wound as directed:   · Keep your wound clean and dry  You may need to cover your wound when you bathe  · Limit movements,  such as stretching, to prevent bleeding, tearing, and swelling in your wound  · Protect your wound  Avoid sunlight for at least 6 months  Apply mild, unscented lotion or cream to the skin around your wound to keep it moist     · Do not smoke  If you smoke, it is never too late to quit  Smoking decreases blood flow to the wound and delays healing  Ask for information if you need help quitting  · Drink liquids as directed  Ask how much liquid to drink each day and which liquids are best for you  Liquids help keep your skin moist so your wound can heal      · Eat a variety of healthy foods  Foods rich in protein, such as meat, eggs, and dairy products, help repair tissue  Carbohydrate-rich foods, such as bread and cereals, help increase cell growth and decrease the risk for wound infection  Do not have caffeine  Ask if you should take vitamins   Vitamins A and C may help tissue formation and increase scar tissue strength  Contact your healthcare provider if:   · You have a fever  · Your pain gets worse or does not go away, even after treatment  · Your skin is red, swollen, or draining pus  · You have questions or concerns about your condition or care  Return to the emergency department if:   · Blood soaks through your bandage  · You have severe pain  © Copyright [address] Information is for End User's use only and may not be sold, redistributed or otherwise used for commercial purposes  All illustrations and images included in CareNotes® are the copyrighted property of [employer] Inc  or [facility] Steph Tripathi   The above information is an  only  It is not intended as medical advice for individual conditions or treatments  Talk to your doctor, nurse or pharmacist before following any medical regimen to see if it is safe and effective for you  Arthrogram   WHAT YOU NEED TO KNOW:   What is an arthrogram?  An arthrogram is an x-ray of a joint  Dye is injected to help healthcare providers see your joint clearly  Tell healthcare providers if you are allergic to iodine or shellfish  You may also be allergic to the dye  Why might I need an arthrogram?  Arthrograms are done to help find the cause of joint pain and to see if surgery is needed  After joint surgery, an arthrogram may be done to check how well your joint is healing  If you have a prosthesis, you may need an arthrogram to see if it is loose  What happens during an arthrogram?   · You may be given a shot of anesthesia medicine to numb the area around your joint  A long, thin needle will be put into your joint space  Your healthcare provider may use x-rays to guide him when he inserts the needle  Joint fluid may be removed  A small amount of contrast dye is injected into your joint  The dye will help your healthcare provider see that he is in the right area   Once the needle is in the right area of your joint space, more dye will be injected  Medicine to help keep the dye in your joint space without spreading to other tissues may also be given  · After the dye is given into your joint space, the needle is removed  You may be asked to move your joint around to help the dye coat your whole joint  X-rays may then be taken  You may also need an MRI or CT  Do not enter the MRI room with anything metal  Metal can cause serious injury  Tell healthcare providers if you have any metal in or on your body  You will need to stay very still while the pictures are being taken of your joint  What happens after an arthrogram?  You will be taken to a room to rest after your procedure  Do not get out of bed until your healthcare provider says you can  Your healthcare provider will tell you when you can go home  If you are staying in the hospital, you will be taken back to your room  What are the risks of an arthrogram?   · You may have a burning feeling in your skin when the shot of anesthesia medicine is given  During or after your procedure, you may feel weak and faint  The shot may create air bubbles that move into your blood vessels and cause breathing problems  You may have pain, redness, and swelling in the area where your shots were given  Your joint may also become swollen and painful  · You may lose feeling in your arm or leg for a short time after the shot is given  Muscles or tendons may be injured, and you may bleed  You may also get a skin or joint infection after your procedure  You may be at an increased risk for blood clots  The contrast dye may cause an allergic reaction, seizures, or kidney damage  Without this procedure, your joint problem or pain may worsen  How do I care for myself at home after an arthrogram?  Rest your joint and use ice to decrease pain and swelling  Use an ice pack or put crushed ice in a plastic bag   Cover the ice pack with a towel and place it on the area for 15 to 20 minutes every hour, or as directed  When should I contact my healthcare provider? Contact your healthcare provider if:  · You have new numbness in the arm or leg where you had your arthrogram      · You have a new rash  · You feel weak or dizzy  · You have questions or concerns about your condition or care  When should I seek immediate care? Seek care immediately or call 911 if:  · You have pain, redness, or swelling in the area where your injection was given  · You have a fever or your joint becomes swollen, warm, or more painful  · You have skin changes such as red or dark spots, or hardened, tight skin  · Your joints feel stiff, or you have trouble moving your limbs  · Your skin becomes swollen, itchy, or feels like it is burning  · You have a seizure  · You have sudden trouble breathing  CARE AGREEMENT:   You have the right to help plan your care  Learn about your health condition and how it may be treated  Discuss treatment options with your healthcare providers to decide what care you want to receive  You always have the right to refuse treatment  The above information is an  only  It is not intended as medical advice for individual conditions or treatments  Talk to your doctor, nurse or pharmacist before following any medical regimen to see if it is safe and effective for you  © Copyright [employer] 2018 Information is for End User's use only and may not be sold, redistributed or otherwise used for commercial purposes   All illustrations and images included in CareNotes® are the copyrighted property of [employer]  or [address] [place], [place] [address]

## 2021-05-17 ENCOUNTER — OFFICE VISIT (OUTPATIENT)
Dept: PODIATRY | Facility: CLINIC | Age: 61
End: 2021-05-17
Payer: COMMERCIAL

## 2021-05-17 VITALS
HEART RATE: 76 BPM | SYSTOLIC BLOOD PRESSURE: 120 MMHG | DIASTOLIC BLOOD PRESSURE: 76 MMHG | WEIGHT: 177 LBS | BODY MASS INDEX: 26.91 KG/M2

## 2021-05-17 DIAGNOSIS — E11.42 DIABETIC POLYNEUROPATHY ASSOCIATED WITH TYPE 2 DIABETES MELLITUS (HCC): Primary | ICD-10-CM

## 2021-05-17 DIAGNOSIS — L97.521 ULCER OF TOE OF LEFT FOOT, LIMITED TO BREAKDOWN OF SKIN (HCC): ICD-10-CM

## 2021-05-17 DIAGNOSIS — M14.672 CHARCOT'S JOINT OF LEFT FOOT: ICD-10-CM

## 2021-05-17 PROCEDURE — 99213 OFFICE O/P EST LOW 20 MIN: CPT | Performed by: PODIATRIST

## 2021-05-17 NOTE — PROGRESS NOTES
This patient was seen on 5/19/21  My role is Foot , Ankle, and Wound Specialist    SUBJECTIVE    Chief Complaint:  DFU, Charcot foot     Patient ID: Marko Redd is a 64 y o  female  Evlyn Officer is here for evaluation of her wound and Charcot foot  She denies any drainage from the foot  No fevers  She notes less edema in the foot  The following portions of the patient's history were reviewed and updated as appropriate: allergies, current medications, past family history, past medical history, past social history, past surgical history and problem list     Review of Systems   Constitutional: Negative  Respiratory: Negative  Skin: Positive for wound  Neurological: Positive for numbness  OBJECTIVE      /76   Pulse 76   Wt 80 3 kg (177 lb)   BMI 26 91 kg/m²     Foot/Ankle Musculoskeletal Exam    General      Neurological: alert      General additional comments: I note some deformity of the Left foot (arch loss, some forefoot adductus) that was present prior to the current exacerbation of Charcot  I note minimal edema in the foot  No interval architectural changes noted  Bilateral infrared cutaneous thermistor measurements were obtained after allowing his skin to equilibrate to room temperature air for 15 minutes after cast boot and shoe removal  10 sites were tested on the acute Charcot foot and the warmest site was selected for comparison to the contralateral foot  The differential is noted to be 0 75 degrees Fahrenheit  Neurovascular      Neurovascular - Right        Dorsalis pedis: 2+      Posterior tibial: 2+      Neurovascular - Left        Dorsalis pedis: 2+      Posterior tibial: 2+       Physical Exam  Vitals and nursing note reviewed  Constitutional:       General: She is not in acute distress  Appearance: Normal appearance  She is not ill-appearing, toxic-appearing or diaphoretic  HENT:      Head: Normocephalic     Cardiovascular:      Rate and Rhythm: Normal rate  Pulses: Normal pulses  Dorsalis pedis pulses are 2+ on the right side and 2+ on the left side  Posterior tibial pulses are 2+ on the right side and 2+ on the left side  Pulmonary:      Effort: Pulmonary effort is normal    Abdominal:      General: Bowel sounds are normal    Musculoskeletal:      Comments: I note some deformity of the Left foot (arch loss, some forefoot adductus) that was present prior to the current exacerbation of Charcot  I note minimal edema in the foot  No interval architectural changes noted  Bilateral infrared cutaneous thermistor measurements were obtained after allowing his skin to equilibrate to room temperature air for 15 minutes after cast boot and shoe removal  10 sites were tested on the acute Charcot foot and the warmest site was selected for comparison to the contralateral foot  The differential is noted to be 0 75 degrees Fahrenheit  Feet:      Right foot:      Protective Sensation: 10 sites tested  0 sites sensed  Left foot:      Protective Sensation: 10 sites tested  0 sites sensed  Skin:     Capillary Refill: Capillary refill takes less than 2 seconds  Comments: I note her Left hallux nail is stable without drainage  The lateral foot ulcer is healed  Neurological:      General: No focal deficit present  Mental Status: She is alert and oriented to person, place, and time               ASSESSMENT     Diagnoses and all orders for this visit:    Diabetic polyneuropathy associated with type 2 diabetes mellitus ([address] )    Ulcer of toe of left foot, limited to breakdown of skin ([address] )    Charcot's joint of left foot         Problem List Items Addressed This Visit        Endocrine    Diabetic neuropathy ([address] ) - Primary       Musculoskeletal and Integument    Charcot's joint of left foot    Ulcer of toe of left foot, limited to breakdown of skin ([address] )            Problems:  Diabetic peripheral neuropathy  Chronic Charcot  Healed foot ulcer      PLAN    She can transition back to sneaker  She will monitor for any drainage, redness  I will check her back here in a month

## 2021-06-14 ENCOUNTER — OFFICE VISIT (OUTPATIENT)
Dept: PODIATRY | Facility: CLINIC | Age: 61
End: 2021-06-14
Payer: COMMERCIAL

## 2021-06-14 VITALS
HEART RATE: 77 BPM | DIASTOLIC BLOOD PRESSURE: 72 MMHG | WEIGHT: 180 LBS | BODY MASS INDEX: 27.37 KG/M2 | SYSTOLIC BLOOD PRESSURE: 118 MMHG

## 2021-06-14 DIAGNOSIS — B35.1 ONYCHOMYCOSIS: ICD-10-CM

## 2021-06-14 DIAGNOSIS — E11.42 DIABETIC POLYNEUROPATHY ASSOCIATED WITH TYPE 2 DIABETES MELLITUS (HCC): Primary | ICD-10-CM

## 2021-06-14 DIAGNOSIS — L97.521 ULCER OF TOE OF LEFT FOOT, LIMITED TO BREAKDOWN OF SKIN (HCC): ICD-10-CM

## 2021-06-14 DIAGNOSIS — M14.672 CHARCOT'S JOINT OF LEFT FOOT: ICD-10-CM

## 2021-06-14 PROCEDURE — 11720 DEBRIDE NAIL 1-5: CPT | Performed by: PODIATRIST

## 2021-06-14 NOTE — PROGRESS NOTES
Date Patient Seen: 6/14/21       Subjective:     Chief Complaint:  High risk DM footcare     Patient ID: Alina Yun is a 64 y o  female  Stormy Stafford has transitioned back to her DM shoegear  She denies any pain  No swelling  No drainage  The following portions of the patient's history were reviewed and updated as appropriate: allergies, current medications, past family history, past medical history, past social history, past surgical history and problem list     Review of Systems   Constitutional: Negative  Respiratory: Negative  Cardiovascular: Negative  Gastrointestinal: Negative  Musculoskeletal: Negative  Neurological: Positive for numbness  Objective:      /72   Pulse 77   Wt 81 6 kg (180 lb)   BMI 27 37 kg/m²        Physical Exam  Vitals and nursing note reviewed  Constitutional:       General: She is not in acute distress  Appearance: Normal appearance  She is not ill-appearing, toxic-appearing or diaphoretic  Cardiovascular:      Rate and Rhythm: Normal rate  Pulses: Normal pulses  Dorsalis pedis pulses are 2+ on the right side and 2+ on the left side  Posterior tibial pulses are 2+ on the right side and 2+ on the left side  Pulmonary:      Effort: Pulmonary effort is normal    Musculoskeletal:      Comments: Left foot deformity noted  No edema  No warmth  Prominent styloid process  Feet:      Right foot:      Protective Sensation: 10 sites tested  0 sites sensed  Left foot:      Protective Sensation: 10 sites tested  0 sites sensed  Skin:     Capillary Refill: Capillary refill takes less than 2 seconds  Comments: Non-ulcerated callous noted latera Left midfoot  Nails yellow-brown, 5mm thick, dystrophic, with crumbling subugunal debris x 10  Neurological:      Mental Status: She is alert     Psychiatric:         Mood and Affect: Mood normal             Diagnoses and all orders for this visit:    Diabetic polyneuropathy associated with type 2 diabetes mellitus ([facility] [address] )    Charcot's joint of left foot    Ulcer of toe of left foot, limited to breakdown of skin (HCC)         Assessment:  Onychomycosis  Stable Charcot Left  Diabetic peripheral neuropathy    Plan:  High risk  foot precautions reviewed with patient including the need to wear protective shoegear at all times when walking including in the home, the need to check feet all surfaces daily with a mirror and report and skin breaks to podiatry, the need to apply an emollient to skin of feet daily  Nails x 1 (Left hallux) were debrided with double-action nail forceps of their thickness, length and width  Her Charcot is settled down  The foot is stable  No progressive changes noted

## 2021-06-14 NOTE — PATIENT INSTRUCTIONS
Foot Care for People with Diabetes   WHAT YOU NEED TO KNOW:   · Foot care helps protect your feet and prevent foot ulcers or sores  Long-term high blood sugar levels can damage the blood vessels and nerves in your legs and feet  This damage makes it hard to feel pressure, pain, temperature, and touch  You may not be able to feel a cut or sore, or shoes that are too tight  Foot care is needed to prevent serious problems, such as an infection or amputation  · Diabetes may cause your toes to become crooked or curved under  These changes may affect the way you walk and can lead to increased pressure on your foot  The pressure can decrease blood flow to your feet  Lack of blood flow increases your risk for a foot ulcer  Do not ignore small problems, such as dry skin or small wounds  These can become life-threatening over time without proper care  DISCHARGE INSTRUCTIONS:   Call your care team provider if:   · Your feet become numb, weak, or hard to move  · You have pus draining from a sore on your foot  · You have a wound on your foot that gets bigger, deeper, or does not heal      · You see blisters, cuts, scratches, calluses, or sores on your foot  · You have a fever, and your feet become red, warm, and swollen  · Your toenails become thick, curled, or yellow  · You find it hard to check your feet because your vision is poor  · You have questions or concerns about your condition or care  Foot care:   · Check your feet each day  Look at your whole foot, including the bottom, and between and under your toes  Check for wounds, corns, and calluses  Use a mirror to see the bottom of your feet  The skin on your feet may be shiny, tight, or darker than normal  Your feet may also be cold and pale  Feel your feet by running your hands along the tops, bottoms, sides, and between your toes  Redness, swelling, and warmth are signs of blood flow problems that can lead to a foot ulcer   Do not try to remove corns or calluses yourself  · Wash your feet each day with soap and warm water  Do not use hot water, because this can injure your foot  Dry your feet gently with a towel after you wash them  Dry between and under your toes  · Apply lotion or a moisturizer on your dry feet  Ask your care team provider what lotions are best to use  Do not put lotion or moisturizer between your toes  Moisture between your toes could lead to skin breakdown  · Cut your toenails correctly  File or cut your toenails straight across  Use a soft brush to clean around your toenails  If your toenails are very thick, you may need to have a care team provider or specialist cut them  · Protect your feet  Do not walk barefoot or wear your shoes without socks  Check your shoes for rocks or other objects that can hurt your feet  Wear cotton socks to help keep your feet dry  Wear socks without toe seams, or wear them with the seams inside out  Change your socks each day  Do not wear socks that are dirty or damp  · Wear shoes that fit well  Wear shoes that do not rub against any area of your feet  Your shoes should be ½ to ¾ inch (1 to 2 centimeters) longer than your feet  Your shoes should also have extra space around the widest part of your feet  Walking or athletic shoes with laces or straps that adjust are best  Ask your care team provider for help to choose shoes that fit you best  Ask him or her if you need to wear an insert, orthotic, or bandage on your feet  · Do not smoke  Smoking can damage your blood vessels and put you at increased risk for foot ulcers  Ask your care team provider for information if you currently smoke and need help to quit  E-cigarettes or smokeless tobacco still contain nicotine  Talk to your care team provider before you use these products  Follow up with your diabetes care team provider or foot specialist as directed:   You will need to have your feet checked at least once a oleg Haass may need a foot exam more often if you have nerve damage, foot deformities, or ulcers  Write down your questions so you remember to ask them during your visits  © Copyright [address] Information is for End User's use only and may not be sold, redistributed or otherwise used for commercial purposes  All illustrations and images included in CareNotes® are the copyrighted property of [employer]  or [facility] Steph Tripathi   The above information is an  only  It is not intended as medical advice for individual conditions or treatments  Talk to your doctor, nurse or pharmacist before following any medical regimen to see if it is safe and effective for you

## 2021-08-16 ENCOUNTER — OFFICE VISIT (OUTPATIENT)
Dept: PODIATRY | Facility: CLINIC | Age: 61
End: 2021-08-16
Payer: COMMERCIAL

## 2021-08-16 VITALS
HEIGHT: 68 IN | BODY MASS INDEX: 27.13 KG/M2 | WEIGHT: 179 LBS | SYSTOLIC BLOOD PRESSURE: 116 MMHG | DIASTOLIC BLOOD PRESSURE: 80 MMHG

## 2021-08-16 DIAGNOSIS — E11.42 DIABETIC POLYNEUROPATHY ASSOCIATED WITH TYPE 2 DIABETES MELLITUS (HCC): ICD-10-CM

## 2021-08-16 DIAGNOSIS — B35.1 ONYCHOMYCOSIS: ICD-10-CM

## 2021-08-16 DIAGNOSIS — L84 CORNS AND CALLOSITIES: Primary | ICD-10-CM

## 2021-08-16 PROCEDURE — 11721 DEBRIDE NAIL 6 OR MORE: CPT | Performed by: PODIATRIST

## 2021-10-20 PROBLEM — L97.521 ULCER OF TOE OF LEFT FOOT, LIMITED TO BREAKDOWN OF SKIN (HCC): Status: RESOLVED | Noted: 2021-05-11 | Resolved: 2021-10-20

## 2021-10-20 PROBLEM — B35.1 ONYCHOMYCOSIS: Status: ACTIVE | Noted: 2021-10-20

## 2021-10-20 PROBLEM — L03.032 CELLULITIS OF LEFT TOE: Status: RESOLVED | Noted: 2021-05-03 | Resolved: 2021-10-20

## 2021-12-06 ENCOUNTER — OFFICE VISIT (OUTPATIENT)
Dept: PODIATRY | Facility: CLINIC | Age: 61
End: 2021-12-06
Payer: COMMERCIAL

## 2021-12-06 VITALS
HEART RATE: 79 BPM | DIASTOLIC BLOOD PRESSURE: 80 MMHG | BODY MASS INDEX: 27.16 KG/M2 | SYSTOLIC BLOOD PRESSURE: 144 MMHG | WEIGHT: 176 LBS

## 2021-12-06 DIAGNOSIS — B35.1 ONYCHOMYCOSIS: ICD-10-CM

## 2021-12-06 DIAGNOSIS — E11.42 DIABETIC POLYNEUROPATHY ASSOCIATED WITH TYPE 2 DIABETES MELLITUS (HCC): Primary | ICD-10-CM

## 2021-12-06 PROCEDURE — 11721 DEBRIDE NAIL 6 OR MORE: CPT | Performed by: PODIATRIST

## 2021-12-06 RX ORDER — SERTRALINE HYDROCHLORIDE 100 MG/1
TABLET, FILM COATED ORAL
COMMUNITY
Start: 2021-04-01

## 2021-12-06 RX ORDER — HYDROXYCHLOROQUINE SULFATE 200 MG/1
200 TABLET, FILM COATED ORAL 2 TIMES DAILY
COMMUNITY
Start: 2021-12-01

## 2021-12-06 RX ORDER — FOLIC ACID 1 MG/1
1000 TABLET ORAL DAILY
COMMUNITY
Start: 2021-11-13

## 2021-12-06 RX ORDER — MELOXICAM 7.5 MG/1
TABLET ORAL
COMMUNITY
Start: 2021-11-03

## 2021-12-06 RX ORDER — PREDNISONE 1 MG/1
5 TABLET ORAL DAILY
COMMUNITY
Start: 2021-12-01

## 2022-03-07 ENCOUNTER — OFFICE VISIT (OUTPATIENT)
Dept: PODIATRY | Facility: CLINIC | Age: 62
End: 2022-03-07
Payer: COMMERCIAL

## 2022-03-07 VITALS
HEART RATE: 80 BPM | DIASTOLIC BLOOD PRESSURE: 70 MMHG | WEIGHT: 181 LBS | SYSTOLIC BLOOD PRESSURE: 129 MMHG | BODY MASS INDEX: 27.93 KG/M2

## 2022-03-07 DIAGNOSIS — B35.1 ONYCHOMYCOSIS: ICD-10-CM

## 2022-03-07 DIAGNOSIS — E11.42 DIABETIC POLYNEUROPATHY ASSOCIATED WITH TYPE 2 DIABETES MELLITUS (HCC): Primary | ICD-10-CM

## 2022-03-07 PROCEDURE — 11721 DEBRIDE NAIL 6 OR MORE: CPT | Performed by: PODIATRIST

## 2022-03-07 RX ORDER — GLYBURIDE 2.5 MG/1
TABLET ORAL
COMMUNITY
Start: 2022-02-14

## 2022-03-07 NOTE — PROGRESS NOTES
Date Patient Seen: 3/7/22       Subjective:     Chief Complaint:  High risk DM footcare     Patient ID: Ronnie Sewell is a 63 y o  female  She's here today for high risk DM footcare  The following portions of the patient's history were reviewed and updated as appropriate: allergies, current medications, past family history, past medical history, past social history, past surgical history and problem list     Review of Systems   Constitutional: Negative  Respiratory: Negative  Cardiovascular: Negative  Gastrointestinal: Negative  Musculoskeletal: Negative  Neurological: Positive for numbness  Objective:      /70   Pulse 80   Wt 82 1 kg (181 lb)   BMI 27 93 kg/m²        Physical Exam  Vitals and nursing note reviewed  Constitutional:       General: She is not in acute distress  Appearance: Normal appearance  She is not ill-appearing, toxic-appearing or diaphoretic  Cardiovascular:      Rate and Rhythm: Normal rate  Pulses: Normal pulses  Dorsalis pedis pulses are 2+ on the right side and 2+ on the left side  Posterior tibial pulses are 2+ on the right side and 2+ on the left side  Pulmonary:      Effort: Pulmonary effort is normal    Musculoskeletal:      Comments: Left foot deformity noted  No edema  No warmth  Prominent styloid process  Feet:      Right foot:      Protective Sensation: 10 sites tested  0 sites sensed  Left foot:      Protective Sensation: 10 sites tested  0 sites sensed  Skin:     Capillary Refill: Capillary refill takes less than 2 seconds  Comments: No callous today on either foot  Nails yellow-brown, 5mm thick, dystrophic, with crumbling subugunal debris x 10  Neurological:      Mental Status: She is alert     Psychiatric:         Mood and Affect: Mood normal             Diagnoses and all orders for this visit:    Diabetic polyneuropathy associated with type 2 diabetes mellitus ([facility] )    Onychomycosis    Other orders  -     glyBURIDE (DIABETA) 2 5 mg tablet; TAKE 1 TABLET DAILY WITH BREAKFAST OR THE FIRST MAIN MEAL OF THE DAY         Assessment:  Onychomycosis  Diabetic peripheral neuropathy    Plan:  High risk  foot precautions reviewed with patient including the need to wear protective shoegear at all times when walking including in the home, the need to check feet all surfaces daily with a mirror and report and skin breaks to podiatry, the need to apply an emollient to skin of feet daily  Nails x 10 were debrided with double-action nail forceps of their thickness, length and width

## 2022-03-07 NOTE — PATIENT INSTRUCTIONS
Foot Care for People with Diabetes   WHAT YOU NEED TO KNOW:   · Foot care helps protect your feet and prevent foot ulcers or sores  Long-term high blood sugar levels can damage the blood vessels and nerves in your legs and feet  This damage makes it hard to feel pressure, pain, temperature, and touch  You may not be able to feel a cut or sore, or shoes that are too tight  Foot care is needed to prevent serious problems, such as an infection or amputation  · Diabetes may cause your toes to become crooked or curved under  These changes may affect the way you walk and can lead to increased pressure on your foot  The pressure can decrease blood flow to your feet  Lack of blood flow increases your risk for a foot ulcer  Do not ignore small problems, such as dry skin or small wounds  These can become life-threatening over time without proper care  DISCHARGE INSTRUCTIONS:   Call your care team provider if:   · Your feet become numb, weak, or hard to move  · You have pus draining from a sore on your foot  · You have a wound on your foot that gets bigger, deeper, or does not heal      · You see blisters, cuts, scratches, calluses, or sores on your foot  · You have a fever, and your feet become red, warm, and swollen  · Your toenails become thick, curled, or yellow  · You find it hard to check your feet because your vision is poor  · You have questions or concerns about your condition or care  Foot care:   · Check your feet each day  Look at your whole foot, including the bottom, and between and under your toes  Check for wounds, corns, and calluses  Use a mirror to see the bottom of your feet  The skin on your feet may be shiny, tight, or darker than normal  Your feet may also be cold and pale  Feel your feet by running your hands along the tops, bottoms, sides, and between your toes  Redness, swelling, and warmth are signs of blood flow problems that can lead to a foot ulcer   Do not try to remove corns or calluses yourself  · Wash your feet each day with soap and warm water  Do not use hot water, because this can injure your foot  Dry your feet gently with a towel after you wash them  Dry between and under your toes  · Apply lotion or a moisturizer on your dry feet  Ask your care team provider what lotions are best to use  Do not put lotion or moisturizer between your toes  Moisture between your toes could lead to skin breakdown  · Cut your toenails correctly  File or cut your toenails straight across  Use a soft brush to clean around your toenails  If your toenails are very thick, you may need to have a care team provider or specialist cut them  · Protect your feet  Do not walk barefoot or wear your shoes without socks  Check your shoes for rocks or other objects that can hurt your feet  Wear cotton socks to help keep your feet dry  Wear socks without toe seams, or wear them with the seams inside out  Change your socks each day  Do not wear socks that are dirty or damp  · Wear shoes that fit well  Wear shoes that do not rub against any area of your feet  Your shoes should be ½ to ¾ inch (1 to 2 centimeters) longer than your feet  Your shoes should also have extra space around the widest part of your feet  Walking or athletic shoes with laces or straps that adjust are best  Ask your care team provider for help to choose shoes that fit you best  Ask him or her if you need to wear an insert, orthotic, or bandage on your feet  · Do not smoke  Smoking can damage your blood vessels and put you at increased risk for foot ulcers  Ask your care team provider for information if you currently smoke and need help to quit  E-cigarettes or smokeless tobacco still contain nicotine  Talk to your care team provider before you use these products  Follow up with your diabetes care team provider or foot specialist as directed:   You will need to have your feet checked at least once a year  Merlyn Echavarria may need a foot exam more often if you have nerve damage, foot deformities, or ulcers  Write down your questions so you remember to ask them during your visits  © Copyright [employer] 2022 Information is for End User's use only and may not be sold, redistributed or otherwise used for commercial purposes  All illustrations and images included in CareNotes® are the copyrighted property of A [employer] M , Inc  or [facility] Steph Tripathi   The above information is an  only  It is not intended as medical advice for individual conditions or treatments  Talk to your doctor, nurse or pharmacist before following any medical regimen to see if it is safe and effective for you

## 2022-07-25 ENCOUNTER — OFFICE VISIT (OUTPATIENT)
Dept: PODIATRY | Facility: CLINIC | Age: 62
End: 2022-07-25
Payer: COMMERCIAL

## 2022-07-25 VITALS
WEIGHT: 185 LBS | BODY MASS INDEX: 28.55 KG/M2 | HEART RATE: 87 BPM | SYSTOLIC BLOOD PRESSURE: 133 MMHG | DIASTOLIC BLOOD PRESSURE: 76 MMHG

## 2022-07-25 DIAGNOSIS — E11.42 DIABETIC POLYNEUROPATHY ASSOCIATED WITH TYPE 2 DIABETES MELLITUS (HCC): Primary | ICD-10-CM

## 2022-07-25 DIAGNOSIS — L97.522 ULCER OF LEFT FOOT, WITH FAT LAYER EXPOSED (HCC): ICD-10-CM

## 2022-07-25 PROCEDURE — 11042 DBRDMT SUBQ TIS 1ST 20SQCM/<: CPT | Performed by: PODIATRIST

## 2022-07-25 PROCEDURE — 99213 OFFICE O/P EST LOW 20 MIN: CPT | Performed by: PODIATRIST

## 2022-07-25 NOTE — PROGRESS NOTES
This patient was seen on 7/25/2022  My role is Foot , Ankle, and Wound Specialist    SUBJECTIVE    Chief Complaint:  New foot ulcer     Patient ID: Sana Hayden is a 63 y o  female  Pt arrives for routine foot care  Pt reports she has a new open area on the left foot  This occurred when walking on vacation  She's been keeping it covered with topical antibiotic ointment and a bandaid  The following portions of the patient's history were reviewed and updated as appropriate: allergies, current medications, past family history, past medical history, past social history, past surgical history and problem list     Review of Systems   Constitutional: Negative  Skin: Positive for wound  Neurological: Positive for numbness  OBJECTIVE      /76   Pulse 87   Wt 83 9 kg (185 lb)   BMI 28 55 kg/m²     Foot/Ankle Musculoskeletal Exam    General      Neurological: alert      General additional comments: Left foot deformity noted  No edema  No warmth  Prominent styloid process  Neurovascular      Neurovascular - Right        Dorsalis pedis: 2+      Posterior tibial: 2+      Neurovascular - Left        Dorsalis pedis: 2+      Posterior tibial: 2+       Physical Exam  Vitals and nursing note reviewed  Constitutional:       General: She is not in acute distress  Appearance: Normal appearance  She is not ill-appearing, toxic-appearing or diaphoretic  Cardiovascular:      Rate and Rhythm: Normal rate  Pulses: Normal pulses  Dorsalis pedis pulses are 2+ on the right side and 2+ on the left side  Posterior tibial pulses are 2+ on the right side and 2+ on the left side  Pulmonary:      Effort: Pulmonary effort is normal    Musculoskeletal:      Comments: Left foot deformity noted  No edema  No warmth  Prominent styloid process  Feet:      Right foot:      Protective Sensation: 10 sites tested  0 sites sensed  Left foot:      Protective Sensation: 10 sites tested  0 sites sensed  Skin:     Capillary Refill: Capillary refill takes less than 2 seconds  Comments: No callous today on either foot  Nails yellow-brown, 5mm thick, dystrophic, with crumbling subugunal debris x 10  Neurological:      Mental Status: She is alert  Psychiatric:         Mood and Affect: Mood normal          Wound # 1  Location: left lateral foot  Length 0 4cm: Width 0 6cm: Depth 0 2cm:   Deepest Tissue Noted in Base: SQ  Probe to Bone?: no  Peripheral Skin Description: macerated callus  Granulation: 90% Fibrotic Tissue: 10% Necrotic Tissue: 0%  Drainage Amount: minimal  Signs of Infection: no  Total debrided 0 24 square centimeters      ASSESSMENT     Diagnoses and all orders for this visit:    Diabetic polyneuropathy associated with type 2 diabetes mellitus (HCC)    [facility] left [facility]         Problem List Items Addressed This Visit        Endocrine    Diabetic neuropathy ([facility] [address] ) - Primary       Musculoskeletal and Integument    Ulcer of left foot, with fat layer exposed ([facility] )              PLAN    DEBRIDEMENT NOTE    A formal timeout including patient identification, laterality and existing allergies using [facility] protocol was conducted  Procedure Performed: Debridement of subcutaneous tissue- >20 sq cm (42934)  Under aseptic technique, the wound was thoroughly explored and bluntly probed for undermining, deep sinus tracts and bone involvement  Sterile instrumentation including scalpel, forceps and curette used to excise the clearly delineated devitalized subcutaneous tissue (fibrotic tissue and necrotic non-viable portions of fat) exposing viable tissue  After debridement, bleeding in the wound bed base was noted indicated viable subcutaneous tissue had been exposed  Bleeding controlled with gentle pressure  Patient tolerated debridement without complications  The wound was dressed  Wound dressing technique and frequency described to patient   I am to be called if any signs of infection develop such as erythema, increased wound size or significant change in appearance of wound, odor, pain, increased or change in color of drainage, swelling, fever, chills, nightsweats  I reviewed these signs with the patient  I recommended limiting WB to bathroom priveleges and meal table and to use any prescribed offloading devices in an effort to allow proper rest and healing of the wounded area  I explained that good wound care and compliance are necessary to allow healing and to prevent toe loss or limb loss  Triple abx ointment applied to wound, covered with island dressing  To be changed daily using silver wound gel and dsd, pt to obtain from [facility] pharmacy  Return in 2 weeks

## 2022-08-08 ENCOUNTER — OFFICE VISIT (OUTPATIENT)
Dept: PODIATRY | Facility: CLINIC | Age: 62
End: 2022-08-08
Payer: COMMERCIAL

## 2022-08-08 VITALS
BODY MASS INDEX: 28.7 KG/M2 | DIASTOLIC BLOOD PRESSURE: 70 MMHG | HEART RATE: 75 BPM | WEIGHT: 186 LBS | SYSTOLIC BLOOD PRESSURE: 115 MMHG

## 2022-08-08 DIAGNOSIS — L97.522 ULCER OF LEFT FOOT, WITH FAT LAYER EXPOSED (HCC): ICD-10-CM

## 2022-08-08 DIAGNOSIS — E11.42 DIABETIC POLYNEUROPATHY ASSOCIATED WITH TYPE 2 DIABETES MELLITUS (HCC): Primary | ICD-10-CM

## 2022-08-08 PROCEDURE — 99213 OFFICE O/P EST LOW 20 MIN: CPT | Performed by: PODIATRIST

## 2022-08-08 NOTE — PROGRESS NOTES
This patient was seen on 8/8/22  My role is Foot , Ankle, and Wound Specialist    SUBJECTIVE    Chief Complaint:  Follow-up of foot ulcer site  Patient ID: Olinda Mazariegos is a 63 y o  female  Pt arrives for a follow-up check of her wound site  Pt had reported last time she has a new open area on the left foot  This occurred when walking on vacation  She's been keeping it covered with topical antibiotic ointment and a bandaid  She now notes it's closed and now longer draining  The following portions of the patient's history were reviewed and updated as appropriate: allergies, current medications, past family history, past medical history, past social history, past surgical history and problem list     Review of Systems   Constitutional: Negative  Skin: Negative for wound  Neurological: Positive for numbness  OBJECTIVE      /70   Pulse 75   Wt 84 4 kg (186 lb)   BMI 28 70 kg/m²     Foot/Ankle Musculoskeletal Exam    General      Neurological: alert      General additional comments: Left foot deformity noted  No edema  No warmth  Prominent styloid process  Neurovascular      Neurovascular - Right        Dorsalis pedis: 2+      Posterior tibial: 2+      Neurovascular - Left        Dorsalis pedis: 2+      Posterior tibial: 2+       Physical Exam  Vitals and nursing note reviewed  Constitutional:       General: She is not in acute distress  Appearance: Normal appearance  She is not ill-appearing, toxic-appearing or diaphoretic  Cardiovascular:      Rate and Rhythm: Normal rate  Pulses: Normal pulses  Dorsalis pedis pulses are 2+ on the right side and 2+ on the left side  Posterior tibial pulses are 2+ on the right side and 2+ on the left side  Pulmonary:      Effort: Pulmonary effort is normal    Musculoskeletal:      Comments: Left foot deformity noted  No edema  No warmth  Prominent styloid process     Feet:      Right foot:      Protective Sensation: 10 sites tested  0 sites sensed  Left foot:      Protective Sensation: 10 sites tested  0 sites sensed  Skin:     Capillary Refill: Capillary refill takes less than 2 seconds  Comments: The wound site is healed lateral Left forefoot  Nails yellow-brown, 5mm thick, dystrophic, with crumbling subugunal debris x 10  Neurological:      Mental Status: She is alert  Psychiatric:         Mood and Affect: Mood normal              ASSESSMENT     Diagnoses and all orders for this visit:    Diabetic polyneuropathy associated with type 2 diabetes mellitus ([facility] )    Ul[facility] of left [facility]         Problem List Items Addressed This Visit        Endocrine    Diabetic neuropathy ([facility] ) - Primary       Musculoskeletal and Integument    Ulcer of left foot, with fat layer exposed ([facility] )              PLAN    No further specific wound care needed to the lateral foot  Foot precautions reviewed with patient including the need to wear protective shoegear at all times when walking including in the home, the need to check feet all surfaces daily with a mirror and report and skin breaks to podiatry, the need to apply an emollient to skin of feet daily

## 2023-02-28 ENCOUNTER — OFFICE VISIT (OUTPATIENT)
Dept: PODIATRY | Facility: CLINIC | Age: 63
End: 2023-02-28

## 2023-02-28 VITALS
SYSTOLIC BLOOD PRESSURE: 173 MMHG | WEIGHT: 187.8 LBS | HEIGHT: 67 IN | HEART RATE: 120 BPM | DIASTOLIC BLOOD PRESSURE: 94 MMHG | BODY MASS INDEX: 29.47 KG/M2

## 2023-02-28 DIAGNOSIS — L97.522 ULCER OF LEFT FOOT, WITH FAT LAYER EXPOSED (HCC): Primary | ICD-10-CM

## 2023-02-28 DIAGNOSIS — E11.42 DIABETIC POLYNEUROPATHY ASSOCIATED WITH TYPE 2 DIABETES MELLITUS (HCC): ICD-10-CM

## 2023-02-28 NOTE — PROGRESS NOTES
This patient was seen on 2/28/23  My role is Foot , Ankle, and Wound Specialist    SUBJECTIVE    Chief Complaint:  Foot ulcer     Patient ID: Marlen Lee is a 63 y o  female  Hawk Brownlee is here with a cc of an ongoing foot ulcer Left foot  She noted the foot ulcer developed about two months ago  She never sought medical consultation about it  She's been dressing it with bacitracin ointment and bandaid and using a surgical shoe and felt offloading pad  She states the wound has gotten smaller over that time  The following portions of the patient's history were reviewed and updated as appropriate: allergies, current medications, past family history, past medical history, past social history, past surgical history and problem list     Review of Systems   Constitutional: Negative  Skin: Positive for wound  Neurological: Positive for numbness  OBJECTIVE      BP (!) 173/94 (BP Location: Left arm, Patient Position: Sitting, Cuff Size: Adult) Comment: pt stated she was neverves  Pulse (!) 120   Ht 5' 7" (1 702 m)   Wt 85 2 kg (187 lb 12 8 oz)   BMI 29 41 kg/m²     Foot/Ankle Musculoskeletal Exam    General    Neurological: alert       Physical Exam  Vitals and nursing note reviewed  Constitutional:       General: She is not in acute distress  Appearance: Normal appearance  She is not ill-appearing, toxic-appearing or diaphoretic  HENT:      Head: Normocephalic and atraumatic  Pulmonary:      Effort: Pulmonary effort is normal       Breath sounds: Normal breath sounds  Skin:     Capillary Refill: Capillary refill takes less than 2 seconds  Comments: Wound # 1  Location: Subhallux Left foot  Length 0 8cm: Width 0 5cm: Depth 0 2cm:   Deepest Tissue Noted in Base: SubQ  Probe to Bone?: No  Peripheral Skin Description: Callous  Granulation: 80% Fibrotic Tissue: 20% Necrotic Tissue: 0%  Drainage Amount: Scant  Signs of Infection: None  Total debrided   04 square centimeters Neurological:      Mental Status: She is alert  ASSESSMENT     Diagnoses and all orders for this visit:    Ulcer of left foot, with fat layer exposed ([facility] )    Diabetic polyneuropathy associated with type 2 diabetes mellitus ([facility] )         Problem List Items Addressed This Visit        Endocrine    Diabetic neuropathy ([facility] [address] )       Musculoskeletal and Integument    Ulcer of left foot, with fat layer exposed ([facility] ) - Primary           PLAN    I recommend follow-up here to check on progress  A formal timeout including patient identification, laterality and existing allergies using [facility] protocol was conducted  Procedure Performed: Debridement of subcutaneous tissue- >20 sq cm (43260)  Under aseptic technique, the wound was thoroughly explored and bluntly probed for undermining, deep sinus tracts and bone involvement  Sterile instrumentation including scalpel, forceps and curette used to excise the clearly delineated devitalized subcutaneous tissue (fibrotic tissue and necrotic non-viable portions of fat) exposing viable tissue  After debridement, bleeding in the wound bed base was noted indicated viable subcutaneous tissue had been exposed  Bleeding controlled with gentle pressure  Patient tolerated debridement without complications  The wound was dressed  Wound dressing technique and frequency described to patient  I am to be called if any signs of infection develop such as erythema, increased wound size or significant change in appearance of wound, odor, pain, increased or change in color of drainage, swelling, fever, chills, nightsweats  I reviewed these signs with the patient  I recommended limiting WB to bathroom priveleges and meal table and to use a wedge shoe (she has one at home) in an effort to allow proper rest and healing of the wounded area  I explained that good wound care and compliance are necessary to allow healing and to prevent toe loss or limb loss

## 2023-04-26 ENCOUNTER — OFFICE VISIT (OUTPATIENT)
Dept: PODIATRY | Facility: CLINIC | Age: 63
End: 2023-04-26

## 2023-04-26 VITALS
HEART RATE: 94 BPM | DIASTOLIC BLOOD PRESSURE: 75 MMHG | BODY MASS INDEX: 31.52 KG/M2 | SYSTOLIC BLOOD PRESSURE: 147 MMHG | WEIGHT: 200.8 LBS | HEIGHT: 67 IN

## 2023-04-26 DIAGNOSIS — L97.522 ULCER OF LEFT FOOT, WITH FAT LAYER EXPOSED (HCC): Primary | ICD-10-CM

## 2023-04-26 DIAGNOSIS — E11.42 DIABETIC POLYNEUROPATHY ASSOCIATED WITH TYPE 2 DIABETES MELLITUS (HCC): ICD-10-CM

## 2023-04-26 RX ORDER — DULOXETIN HYDROCHLORIDE 30 MG/1
60 CAPSULE, DELAYED RELEASE ORAL
COMMUNITY
Start: 2023-01-31

## 2023-04-26 NOTE — PROGRESS NOTES
"This patient was seen on 4/26/23  My role is Foot , Ankle, and Wound Specialist    SUBJECTIVE    Chief Complaint:  Foot ulcer     Patient ID: Lakisha Haas is a 63 y o  female  Adriana England is here with a cc of an ongoing foot ulcer Left foot  She's been dressing it with bacitracin ointment and bandaid and using a surgical shoe and felt offloading pad  The following portions of the patient's history were reviewed and updated as appropriate: allergies, current medications, past family history, past medical history, past social history, past surgical history and problem list     Review of Systems   Constitutional: Negative  Skin: Positive for wound  Neurological: Positive for numbness  OBJECTIVE      /75   Pulse 94   Ht 5' 7\" (1 702 m)   Wt 91 1 kg (200 lb 12 8 oz)   BMI 31 45 kg/m²     Foot/Ankle Musculoskeletal Exam    General    Neurological: alert       Physical Exam  Vitals and nursing note reviewed  Constitutional:       General: She is not in acute distress  Appearance: Normal appearance  She is not ill-appearing, toxic-appearing or diaphoretic  HENT:      Head: Normocephalic and atraumatic  Pulmonary:      Effort: Pulmonary effort is normal       Breath sounds: Normal breath sounds  Skin:     Capillary Refill: Capillary refill takes less than 2 seconds  Comments: Wound # 1  Location: Subhallux Left foot  Length 0 7cm: Width 0 3cm: Depth 0 2cm:   Deepest Tissue Noted in Base: SubQ  Probe to Bone?: No  Peripheral Skin Description: Callous  Granulation: 80% Fibrotic Tissue: 20% Necrotic Tissue: 0%  Drainage Amount: Scant  Signs of Infection: None  Total debrided   21 square centimeters   Neurological:      Mental Status: She is alert               ASSESSMENT     Diagnoses and all orders for this visit:    Ulcer of left foot, with fat layer exposed ([facility] [facility] )    Diabetic polyneuropathy associated with type 2 diabetes mellitus ([facility] )    Other orders  -     DULoxetine " (CYMBALTA) 30 mg delayed release capsule; 60 mg         Problem List Items Addressed This Visit        Endocrine    Diabetic neuropathy ([facility] [facility] )       Musculoskeletal and Integument    Ulcer of left foot, with fat layer exposed ([facility] ) - Primary           PLAN      A formal timeout including patient identification, laterality and existing allergies using [facility] protocol was conducted  Procedure Performed: Debridement of subcutaneous tissue- >20 sq cm (15856)  Under aseptic technique, the wound was thoroughly explored and bluntly probed for undermining, deep sinus tracts and bone involvement  Sterile instrumentation including scalpel, forceps and curette used to excise the clearly delineated devitalized subcutaneous tissue (fibrotic tissue and necrotic non-viable portions of fat) exposing viable tissue  After debridement, bleeding in the wound bed base was noted indicated viable subcutaneous tissue had been exposed  Bleeding controlled with gentle pressure  Patient tolerated debridement without complications  The wound was dressed  Wound dressing technique and frequency described to patient  I am to be called if any signs of infection develop such as erythema, increased wound size or significant change in appearance of wound, odor, pain, increased or change in color of drainage, swelling, fever, chills, nightsweats  I reviewed these signs with the patient  I recommended limiting WB to bathroom priveleges and meal table and to use any prescribed offloading devices in an effort to allow proper rest and healing of the wounded area  I explained that good wound care and compliance are necessary to allow healing and to prevent toe loss or limb loss  I feel this patient needs to come more frequently for wound debridement to have a good outcome  She's amenable to that plan

## 2023-05-24 ENCOUNTER — OFFICE VISIT (OUTPATIENT)
Dept: PODIATRY | Facility: CLINIC | Age: 63
End: 2023-05-24

## 2023-05-24 VITALS
HEIGHT: 67 IN | WEIGHT: 202 LBS | BODY MASS INDEX: 31.71 KG/M2 | DIASTOLIC BLOOD PRESSURE: 80 MMHG | HEART RATE: 80 BPM | SYSTOLIC BLOOD PRESSURE: 134 MMHG

## 2023-05-24 DIAGNOSIS — E11.42 DIABETIC POLYNEUROPATHY ASSOCIATED WITH TYPE 2 DIABETES MELLITUS (HCC): Primary | ICD-10-CM

## 2023-05-24 DIAGNOSIS — L97.522 ULCER OF LEFT FOOT, WITH FAT LAYER EXPOSED (HCC): ICD-10-CM

## 2023-05-24 NOTE — PROGRESS NOTES
"Date Patient Seen: 5 24 23       Subjective:     Chief Complaint:  Diabetic foot care     Patient ID: Noel Gibbs is a 63 y o  female  Crista Cons is here with a cc of diabertic neuropathy  She states she still sees sporadic drainage from the Right hallux ulcer  She's been dressing it with bacitracin ointment and bandaid and using a surgical shoe and felt offloading pad  The following portions of the patient's history were reviewed and updated as appropriate: allergies, current medications, past family history, past medical history, past social history, past surgical history and problem list     Review of Systems   Constitutional: Negative  Skin: Positive for wound  Neurological: Positive for numbness  Objective:      /80   Pulse 80   Ht 5' 7\" (1 702 m)   Wt 91 6 kg (202 lb)   BMI 31 64 kg/m²        Physical Exam  Vitals and nursing note reviewed  Constitutional:       General: She is not in acute distress  Appearance: Normal appearance  She is not ill-appearing, toxic-appearing or diaphoretic  HENT:      Head: Normocephalic and atraumatic  Pulmonary:      Effort: Pulmonary effort is normal       Breath sounds: Normal breath sounds  Skin:     Capillary Refill: Capillary refill takes less than 2 seconds  Comments: Wound # 1  Location: Subhallux Left foot  The wound is not open  There is some hyperkeratotic tissue  Neurological:      Mental Status: She is alert  Diagnoses and all orders for this visit:    Diabetic polyneuropathy associated with type 2 diabetes mellitus ([address] )    Ulcer of left foot, with fat layer exposed ([address] )         Plan:  I recommend she continue a protective dressing to the area daily to prevent shear/pressure   Foot precautions reviewed with patient including the need to wear protective shoegear at all times when walking including in the home, the need to check feet all surfaces daily with a mirror and report and skin breaks to podiatry, " the need to apply an emollient to skin of feet daily

## 2023-06-27 ENCOUNTER — OFFICE VISIT (OUTPATIENT)
Dept: PODIATRY | Facility: CLINIC | Age: 63
End: 2023-06-27
Payer: COMMERCIAL

## 2023-06-27 VITALS
HEART RATE: 102 BPM | DIASTOLIC BLOOD PRESSURE: 81 MMHG | WEIGHT: 202 LBS | BODY MASS INDEX: 31.71 KG/M2 | RESPIRATION RATE: 19 BRPM | SYSTOLIC BLOOD PRESSURE: 144 MMHG | HEIGHT: 67 IN

## 2023-06-27 DIAGNOSIS — B35.1 ONYCHOMYCOSIS: ICD-10-CM

## 2023-06-27 DIAGNOSIS — E11.42 DIABETIC POLYNEUROPATHY ASSOCIATED WITH TYPE 2 DIABETES MELLITUS (HCC): ICD-10-CM

## 2023-06-27 DIAGNOSIS — L84 CORNS AND CALLOSITIES: Primary | ICD-10-CM

## 2023-06-27 PROCEDURE — 11721 DEBRIDE NAIL 6 OR MORE: CPT | Performed by: PODIATRIST

## 2023-06-27 PROCEDURE — 11056 PARNG/CUTG B9 HYPRKR LES 2-4: CPT | Performed by: PODIATRIST

## 2023-06-27 NOTE — PROGRESS NOTES
"This patient was seen on 6/27/23  Chief Complaint:  Kayla Sands is here for a post-op visit  Subjective:      Patient ID: Kayla Sands is a 63 y o  female  Cheryln Rung is here with a cc of diabertic neuropathy  The following portions of the patient's history were reviewed and updated as appropriate: allergies, current medications, past family history, past medical history, past social history, past surgical history and problem list     Review of Systems   Constitutional: Negative  Skin: Negative for wound  Neurological: Positive for numbness  Objective:    Kayla Sands appears stable, non-toxic and alert  /81   Pulse 102   Resp 19   Ht 5' 7\" (1 702 m)   Wt 91 6 kg (202 lb)   BMI 31 64 kg/m²     Foot/Ankle Musculoskeletal Exam    General    Neurological: alert       Physical Exam  Vitals and nursing note reviewed  Constitutional:       General: She is not in acute distress  Appearance: Normal appearance  She is not ill-appearing, toxic-appearing or diaphoretic  HENT:      Head: Normocephalic and atraumatic  Pulmonary:      Effort: Pulmonary effort is normal       Breath sounds: Normal breath sounds  Skin:     Capillary Refill: Capillary refill takes less than 2 seconds  Comments: Nails yellow-brown, 5mm thick, dystrophic, with crumbling subugunal debris x 10  Callous x 2 noted  Neurological:      Mental Status: She is alert  The incision is well-coapted without dehiscence, signs of infection, active drainage, necrosis  Calf is soft and non-tender  Neurovascular status is intact to the foot  Normal post-op edema and bruising is noted         Diagnoses and all orders for this visit:    Diabetic polyneuropathy associated with type 2 diabetes mellitus ([facility] )    Onychomycosis    Corns and callosities         Problem List Items Addressed This Visit        Endocrine    Diabetic neuropathy ([facility] ) - Primary       Musculoskeletal and Integument " "   Corns and callosities    Onychomycosis         Plan:  Nails x 10 were debrided with double-action nail forceps of their thickness, length and width  Callous x 2 removed with scalpel  Lesion Destruction    Date/Time: 6/27/2023 9:15 AM    Performed by: Gigi Solorio DPM  Authorized by: Gigi Solorio DPM  Universal Protocol:  Consent: Verbal consent obtained  Risks and benefits: risks, benefits and alternatives were discussed  Consent given by: patient  Time out: Immediately prior to procedure a \"time out\" was called to verify the correct patient, procedure, equipment, support staff and site/side marked as required    Timeout called at: 6/27/2023 10:06 AM   Patient understanding: patient states understanding of the procedure being performed  Patient identity confirmed: verbally with patient      Procedure Details - Lesion Destruction:     Number of Lesions:  2  Lesion 1:     Body area:  Lower extremity    Lower extremity location:  R foot    Malignancy: benign hyperkeratotic lesion      Destruction method: scissors used for extraction    Lesion 2:     Body area:  Lower extremity    Lower extremity location:  L foot    Malignancy: benign hyperkeratotic lesion      Destruction method: scissors used for extraction          "

## 2023-08-22 ENCOUNTER — OFFICE VISIT (OUTPATIENT)
Dept: PODIATRY | Facility: CLINIC | Age: 63
End: 2023-08-22
Payer: COMMERCIAL

## 2023-08-22 VITALS
HEIGHT: 67 IN | WEIGHT: 198.4 LBS | SYSTOLIC BLOOD PRESSURE: 140 MMHG | DIASTOLIC BLOOD PRESSURE: 83 MMHG | BODY MASS INDEX: 31.14 KG/M2 | HEART RATE: 83 BPM

## 2023-08-22 DIAGNOSIS — E11.42 DIABETIC POLYNEUROPATHY ASSOCIATED WITH TYPE 2 DIABETES MELLITUS (HCC): ICD-10-CM

## 2023-08-22 DIAGNOSIS — L97.522 ULCER OF LEFT FOOT, WITH FAT LAYER EXPOSED (HCC): Primary | ICD-10-CM

## 2023-08-22 PROCEDURE — 11042 DBRDMT SUBQ TIS 1ST 20SQCM/<: CPT | Performed by: PODIATRIST

## 2023-08-22 PROCEDURE — 99213 OFFICE O/P EST LOW 20 MIN: CPT | Performed by: PODIATRIST

## 2023-08-22 NOTE — LETTER
August 22, 2023     Patient: [place]  YOB: 1960  Date of Visit: 8/22/2023      To Whom it May Concern:    [place] is under my professional care. [place] was seen in my office on 8/22/2023. [place] may return to work with limitations including light duty due to a foot condition . If you have any questions or concerns, please don't hesitate to call.          Sincerely,          Bill Doran DPM        CC: No Recipients

## 2023-08-22 NOTE — PROGRESS NOTES
This patient was seen on 8/22/23. My role is Foot , Ankle, and Wound Specialist    SUBJECTIVE    Chief Complaint:  Foot ulcer     Patient ID: Mercy Archer is a 63 y.o. female. Henny Marie is here stating she has a new foot ulcer. She's been self-treating it with neosporin and dressings daily. She denies much drainage and no fevers, pain. The following portions of the patient's history were reviewed and updated as appropriate: allergies, current medications, past family history, past medical history, past social history, past surgical history and problem list.    Review of Systems   Constitutional: Negative. Skin: Negative for wound. Neurological: Positive for numbness. OBJECTIVE      /83   Pulse 83   Ht 5' 7" (1.702 m)   Wt 90 kg (198 lb 6.4 oz)   BMI 31.07 kg/m²     Foot/Ankle Musculoskeletal Exam    General    Neurological: alert       Physical Exam  Vitals and nursing note reviewed. Constitutional:       General: She is not in acute distress. Appearance: Normal appearance. She is not ill-appearing, toxic-appearing or diaphoretic. HENT:      Head: Normocephalic and atraumatic. Pulmonary:      Effort: Pulmonary effort is normal.      Breath sounds: Normal breath sounds. Skin:     Capillary Refill: Capillary refill takes less than 2 seconds. Comments: Wound # 1  Location: Left lateral forefoot (5th metatarsal head)  Length 1.0cm: Width 1.3cm: Depth 0.2cm:   Deepest Tissue Noted in Base: SubQ  Probe to Bone?: No  Peripheral Skin Description: Callous  Granulation: 80% Fibrotic Tissue: 20% Necrotic Tissue: 0%  Drainage Amount: Scant  Signs of Infection: None  Total debrided 1.3 square centimeters    Nails yellow-brown, 5mm thick, dystrophic, with crumbling subugunal debris x 10. Callous x 2 noted. Neurological:      Mental Status: She is alert.              ASSESSMENT     Diagnoses and all orders for this visit:    Ulcer of left foot, with fat layer exposed ([address])  -     X-ray foot left 3+ views; Future    Diabetic polyneuropathy associated with type 2 diabetes mellitus ([address])  -     X-ray foot left 3+ views; Future         Problem List Items Addressed This Visit        Endocrine    Diabetic neuropathy ([address])    Relevant Orders    X-ray foot left 3+ views       Musculoskeletal and Integument    Ulcer of left foot, with fat layer exposed ([address]) - Primary    Relevant Orders    X-ray foot left 3+ views           PLAN    I ordered xrays of the Left foot. My wet read of the films was negative for any obvious erosions or cystic changes in the fifth metatarsal head and negative for soft tissue emphysema. A formal timeout including patient identification, laterality and existing allergies using [facility] protocol was conducted. Procedure Performed: Debridement of subcutaneous tissue- >20 sq cm (43289). Under aseptic technique, the wound was thoroughly explored and bluntly probed for undermining, deep sinus tracts and bone involvement. Sterile instrumentation including scalpel, forceps and curette used to excise the clearly delineated devitalized subcutaneous tissue (fibrotic tissue and necrotic non-viable portions of fat) exposing viable tissue. After debridement, bleeding in the wound bed base was noted indicated viable subcutaneous tissue had been exposed. Bleeding controlled with gentle pressure. Patient tolerated debridement without complications. The wound was dressed with silver alginate, 1.4" felt around the wound to offload, and bulky offloading dressing utilizing cast padding and coban. Rajni was instructed to keep the dressing clean, dry and intact until next follow-up. I am to be called if any signs of infection develop such as erythema, increased wound size or significant change in appearance of wound, odor, pain, increased or change in color of drainage, swelling, fever, chills, nightsweats. I reviewed these signs with the patient.  I recommended limiting WB to bathroom priveleges and meal table and to use any prescribed offloading devices in an effort to allow proper rest and healing of the wounded area. I explained that good wound care and compliance are necessary to allow healing and to prevent toe loss or limb loss.

## 2023-08-31 ENCOUNTER — PROCEDURE VISIT (OUTPATIENT)
Dept: PODIATRY | Facility: CLINIC | Age: 63
End: 2023-08-31
Payer: COMMERCIAL

## 2023-08-31 VITALS
HEIGHT: 67 IN | DIASTOLIC BLOOD PRESSURE: 76 MMHG | SYSTOLIC BLOOD PRESSURE: 150 MMHG | HEART RATE: 96 BPM | WEIGHT: 198.6 LBS | BODY MASS INDEX: 31.17 KG/M2

## 2023-08-31 DIAGNOSIS — E11.42 DIABETIC POLYNEUROPATHY ASSOCIATED WITH TYPE 2 DIABETES MELLITUS (HCC): ICD-10-CM

## 2023-08-31 DIAGNOSIS — L97.522 ULCER OF LEFT FOOT, WITH FAT LAYER EXPOSED (HCC): Primary | ICD-10-CM

## 2023-08-31 PROCEDURE — 11042 DBRDMT SUBQ TIS 1ST 20SQCM/<: CPT | Performed by: PODIATRIST

## 2023-08-31 PROCEDURE — 99213 OFFICE O/P EST LOW 20 MIN: CPT | Performed by: PODIATRIST

## 2023-08-31 NOTE — PROGRESS NOTES
Date Patient Seen: 8/31/23       Subjective:     Chief Complaint:  Foot ulcer     Patient ID: Petty Higuera is a 66 y.o. female. Clark Lewis presents in a bulky offloading dressing we applied last time to help heal her foot ulcer. She denies pain, fevers, malaise. The following portions of the patient's history were reviewed and updated as appropriate: allergies, current medications, past family history, past medical history, past social history, past surgical history and problem list.    Review of Systems   Constitutional: Negative. Skin: Positive for wound. Neurological: Positive for numbness. Objective:      /76   Pulse 96   Ht 5' 7" (1.702 m)   Wt 90.1 kg (198 lb 9.6 oz)   BMI 31.11 kg/m²        Physical Exam  Vitals and nursing note reviewed. Constitutional:       General: She is not in acute distress. Appearance: Normal appearance. She is not ill-appearing, toxic-appearing or diaphoretic. HENT:      Head: Normocephalic and atraumatic. Pulmonary:      Effort: Pulmonary effort is normal.      Breath sounds: Normal breath sounds. Skin:     Capillary Refill: Capillary refill takes less than 2 seconds. Comments: Wound # 1  Location: Left lateral forefoot (5th metatarsal head)  Length 0.8cm: Width 0.5cm: Depth 0.2cm:   Deepest Tissue Noted in Base: SubQ  Probe to Bone?: No  Peripheral Skin Description: Callous  Granulation: 80% Fibrotic Tissue: 20% Necrotic Tissue: 0%  Drainage Amount: Scant  Signs of Infection: None  Total debrided 0.4 square centimeters    . Neurological:      Mental Status: She is alert. Diagnoses and all orders for this visit:    Ulcer of left foot, with fat layer exposed ([address])    Diabetic polyneuropathy associated with type 2 diabetes mellitus ([address])         Plan:      A formal timeout including patient identification, laterality and existing allergies using [facility] protocol was conducted.     Procedure Performed: Debridement of subcutaneous tissue- >20 sq cm (02958). Under aseptic technique, the wound was thoroughly explored and bluntly probed for undermining, deep sinus tracts and bone involvement. Sterile instrumentation including scalpel, forceps and curette used to excise the clearly delineated devitalized subcutaneous tissue (fibrotic tissue and necrotic non-viable portions of fat) exposing viable tissue. After debridement, bleeding in the wound bed base was noted indicated viable subcutaneous tissue had been exposed. Bleeding controlled with gentle pressure. Patient tolerated debridement without complications. The wound was dressed with a felt offloading pad, a thick plantar foam cushion, multiple layers of cast padding and coban to form a bulky offloading dressing. Silver alginate was applied to the wound prior. I am to be called if any signs of infection develop such as erythema, increased wound size or significant change in appearance of wound, odor, pain, increased or change in color of drainage, swelling, fever, chills, nightsweats. I reviewed these signs with the patient. I recommended limiting WB to bathroom priveleges and meal table and to use any prescribed offloading devices in an effort to allow proper rest and healing of the wounded area. I explained that good wound care and compliance are necessary to allow healing and to prevent toe loss or limb loss.

## 2023-09-06 ENCOUNTER — PROCEDURE VISIT (OUTPATIENT)
Dept: PODIATRY | Facility: CLINIC | Age: 63
End: 2023-09-06
Payer: COMMERCIAL

## 2023-09-06 VITALS
WEIGHT: 199 LBS | HEIGHT: 67 IN | DIASTOLIC BLOOD PRESSURE: 81 MMHG | BODY MASS INDEX: 31.23 KG/M2 | SYSTOLIC BLOOD PRESSURE: 146 MMHG | HEART RATE: 85 BPM

## 2023-09-06 DIAGNOSIS — L97.522 ULCER OF LEFT FOOT, WITH FAT LAYER EXPOSED (HCC): Primary | ICD-10-CM

## 2023-09-06 DIAGNOSIS — E11.42 DIABETIC POLYNEUROPATHY ASSOCIATED WITH TYPE 2 DIABETES MELLITUS (HCC): ICD-10-CM

## 2023-09-06 PROCEDURE — 99213 OFFICE O/P EST LOW 20 MIN: CPT | Performed by: PODIATRIST

## 2023-09-06 PROCEDURE — 11042 DBRDMT SUBQ TIS 1ST 20SQCM/<: CPT | Performed by: PODIATRIST

## 2023-09-06 NOTE — PROGRESS NOTES
Date Patient Seen: 9/6/23       Subjective:     Chief Complaint:  Foot ulcer     Patient ID: Rock Willis is a 66 y.o. female. Tiffanie Hooker presents in a bulky offloading dressing we applied last time to help heal her foot ulcer. She denies pain, fevers, malaise. She's struggling a bit in the surgical shoe as she says she slides off of the shoe laterally and has to readjust the shoe frequently. The following portions of the patient's history were reviewed and updated as appropriate: allergies, current medications, past family history, past medical history, past social history, past surgical history and problem list.    Review of Systems   Constitutional: Negative. Skin: Positive for wound. Neurological: Positive for numbness. Objective:      /81   Pulse 85   Ht 5' 7" (1.702 m)   Wt 90.3 kg (199 lb)   BMI 31.17 kg/m²        Physical Exam  Vitals and nursing note reviewed. Constitutional:       General: She is not in acute distress. Appearance: Normal appearance. She is not ill-appearing, toxic-appearing or diaphoretic. HENT:      Head: Normocephalic and atraumatic. Pulmonary:      Effort: Pulmonary effort is normal.      Breath sounds: Normal breath sounds. Skin:     Capillary Refill: Capillary refill takes less than 2 seconds. Comments: Wound # 1  Location: Left lateral forefoot (5th metatarsal head)  Length 0.4cm: Width 0.4cm: Depth 0.2cm:   Deepest Tissue Noted in Base: SubQ  Probe to Bone?: No  Peripheral Skin Description: Callous  Granulation: 80% Fibrotic Tissue: 20% Necrotic Tissue: 0%  Drainage Amount: Scant  Signs of Infection: None  Total debrided 0.16 square centimeters    . Neurological:      Mental Status: She is alert.             Diagnoses and all orders for this visit:    Ulcer of left foot, with fat layer exposed ([address])  -     Cam Boot    Diabetic polyneuropathy associated with type 2 diabetes mellitus ([address])  -     Cam Boot           Plan:  A short camboot was fitted to help stabilize her in gait. A formal timeout including patient identification, laterality and existing allergies using [facility] protocol was conducted. Procedure Performed: Debridement of subcutaneous tissue- >20 sq cm (64904). Under aseptic technique, the wound was thoroughly explored and bluntly probed for undermining, deep sinus tracts and bone involvement. Sterile instrumentation including scalpel, forceps and curette used to excise the clearly delineated devitalized subcutaneous tissue (fibrotic tissue and necrotic non-viable portions of fat) exposing viable tissue. After debridement, bleeding in the wound bed base was noted indicated viable subcutaneous tissue had been exposed. Bleeding controlled with gentle pressure. Patient tolerated debridement without complications. The wound was dressed. Wound dressing technique and frequency described to patient. I am to be called if any signs of infection develop such as erythema, increased wound size or significant change in appearance of wound, odor, pain, increased or change in color of drainage, swelling, fever, chills, nightsweats. I reviewed these signs with the patient. I recommended limiting WB to bathroom priveleges and meal table and to use any prescribed offloading devices in an effort to allow proper rest and healing of the wounded area. I explained that good wound care and compliance are necessary to allow healing and to prevent toe loss or limb loss.

## 2023-09-20 ENCOUNTER — OFFICE VISIT (OUTPATIENT)
Dept: PODIATRY | Facility: CLINIC | Age: 63
End: 2023-09-20
Payer: COMMERCIAL

## 2023-09-20 VITALS
WEIGHT: 197 LBS | BODY MASS INDEX: 30.92 KG/M2 | HEIGHT: 67 IN | DIASTOLIC BLOOD PRESSURE: 74 MMHG | SYSTOLIC BLOOD PRESSURE: 124 MMHG | HEART RATE: 88 BPM

## 2023-09-20 DIAGNOSIS — L97.522 ULCER OF LEFT FOOT, WITH FAT LAYER EXPOSED (HCC): Primary | ICD-10-CM

## 2023-09-20 DIAGNOSIS — E11.42 DIABETIC POLYNEUROPATHY ASSOCIATED WITH TYPE 2 DIABETES MELLITUS (HCC): ICD-10-CM

## 2023-09-20 PROCEDURE — 99213 OFFICE O/P EST LOW 20 MIN: CPT | Performed by: PODIATRIST

## 2023-09-20 PROCEDURE — 11042 DBRDMT SUBQ TIS 1ST 20SQCM/<: CPT | Performed by: PODIATRIST

## 2023-09-20 NOTE — PROGRESS NOTES
Date Patient Seen: 9/20/23       Subjective:     Chief Complaint:  Foot ulcer     Patient ID: Mandi Kirk is a 66 y.o. female. Kenia Haider presents in a bulky offloading dressing and short camboot we applied last time to help heal her foot ulcer. She denies pain, fevers, malaise. The following portions of the patient's history were reviewed and updated as appropriate: allergies, current medications, past family history, past medical history, past social history, past surgical history and problem list.    Review of Systems   Constitutional: Negative. Skin: Positive for wound. Neurological: Positive for numbness. Objective:      /74   Pulse 88   Ht 5' 7" (1.702 m)   Wt 89.4 kg (197 lb)   BMI 30.85 kg/m²        Physical Exam  Vitals and nursing note reviewed. Constitutional:       General: She is not in acute distress. Appearance: Normal appearance. She is not ill-appearing, toxic-appearing or diaphoretic. HENT:      Head: Normocephalic and atraumatic. Pulmonary:      Effort: Pulmonary effort is normal.      Breath sounds: Normal breath sounds. Skin:     Capillary Refill: Capillary refill takes less than 2 seconds. Comments: Wound # 1  Location: Left lateral forefoot (5th metatarsal head)  Length 0.1cm: Width 0.2cm: Depth 0.1cm:   Deepest Tissue Noted in Base: SubQ  Probe to Bone?: No  Peripheral Skin Description: Callous  Granulation: 80% Fibrotic Tissue: 20% Necrotic Tissue: 0%  Drainage Amount: Scant  Signs of Infection: None  Total debrided 0.02 square centimeters    . Neurological:      Mental Status: She is alert. Diagnoses and all orders for this visit:    Ulcer of left foot, with fat layer exposed ([address])    Diabetic polyneuropathy associated with type 2 diabetes mellitus ([address])         Plan:      A formal timeout including patient identification, laterality and existing allergies using [facility] protocol was conducted.     Procedure Performed: Debridement of subcutaneous tissue- >20 sq cm (76321). Under aseptic technique, the wound was thoroughly explored and bluntly probed for undermining, deep sinus tracts and bone involvement. Sterile instrumentation including scalpel, forceps and curette used to excise the clearly delineated devitalized subcutaneous tissue (fibrotic tissue and necrotic non-viable portions of fat) exposing viable tissue. After debridement, bleeding in the wound bed base was noted indicated viable subcutaneous tissue had been exposed. Bleeding controlled with gentle pressure. Patient tolerated debridement without complications. The wound was dressed with the bulky offloading dressing and camboot. She's to keep it dry and in place and call if fever, chills, nightsweats noted. I reviewed these signs with the patient. I recommended limiting WB to bathroom priveleges and meal table and to use any prescribed offloading devices in an effort to allow proper rest and healing of the wounded area. I explained that good wound care and compliance are necessary to allow healing and to prevent toe loss or limb loss.

## 2023-09-26 ENCOUNTER — PROCEDURE VISIT (OUTPATIENT)
Dept: PODIATRY | Facility: CLINIC | Age: 63
End: 2023-09-26
Payer: COMMERCIAL

## 2023-09-26 VITALS
WEIGHT: 197.6 LBS | DIASTOLIC BLOOD PRESSURE: 81 MMHG | SYSTOLIC BLOOD PRESSURE: 145 MMHG | BODY MASS INDEX: 31.01 KG/M2 | HEART RATE: 81 BPM | HEIGHT: 67 IN

## 2023-09-26 DIAGNOSIS — E11.42 DIABETIC POLYNEUROPATHY ASSOCIATED WITH TYPE 2 DIABETES MELLITUS (HCC): ICD-10-CM

## 2023-09-26 DIAGNOSIS — L97.522 ULCER OF LEFT FOOT, WITH FAT LAYER EXPOSED (HCC): Primary | ICD-10-CM

## 2023-09-26 PROCEDURE — 99213 OFFICE O/P EST LOW 20 MIN: CPT | Performed by: PODIATRIST

## 2023-09-26 NOTE — PROGRESS NOTES
This patient was seen on 9/26/23. My role is Foot , Ankle, and Wound Specialist    SUBJECTIVE    Chief Complaint:  DFU     Patient ID: Carmen Reyes is a 66 y.o. female. Brandie Euceda presents in a bulky offloading dressing and short camboot we applied last time to help heal her foot ulcer. She denies pain, fevers, malaise. The following portions of the patient's history were reviewed and updated as appropriate: allergies, current medications, past family history, past medical history, past social history, past surgical history and problem list.    Review of Systems   Constitutional: Negative. Skin: Positive for wound. Neurological: Positive for numbness. OBJECTIVE      /81   Pulse 81   Ht 5' 7" (1.702 m)   Wt 89.6 kg (197 lb 9.6 oz)   BMI 30.95 kg/m²     Foot/Ankle Musculoskeletal Exam    General    Neurological: alert       Physical Exam  Vitals and nursing note reviewed. Constitutional:       General: She is not in acute distress. Appearance: Normal appearance. She is not ill-appearing, toxic-appearing or diaphoretic. HENT:      Head: Normocephalic and atraumatic. Pulmonary:      Effort: Pulmonary effort is normal.      Breath sounds: Normal breath sounds. Skin:     Capillary Refill: Capillary refill takes less than 2 seconds. Comments: Wound # 1  Location: Left lateral forefoot (5th metatarsal head)  HEALED   Neurological:      Mental Status: She is alert. ASSESSMENT     Diagnoses and all orders for this visit:    Ulcer of left foot, with fat layer exposed ([address])    Diabetic polyneuropathy associated with type 2 diabetes mellitus ([address])         Problem List Items Addressed This Visit        Endocrine    Diabetic neuropathy ([address])       Musculoskeletal and Integument    Ulcer of left foot, with fat layer exposed ([address]) - Primary           PLAN    She will wear her camboot x 7 days and monitor closely for any recurrent drainage.  She may transition back to her diabetic shoegear at that point with a follow-up with me in 4 weeks. Foot precautions reviewed with patient including the need to wear protective shoegear at all times when walking including in the home, the need to check feet all surfaces daily with a mirror and report and skin breaks to podiatry, the need to apply an emollient to skin of feet daily.

## 2023-10-24 ENCOUNTER — PROCEDURE VISIT (OUTPATIENT)
Dept: PODIATRY | Facility: CLINIC | Age: 63
End: 2023-10-24
Payer: COMMERCIAL

## 2023-10-24 VITALS
WEIGHT: 197.6 LBS | DIASTOLIC BLOOD PRESSURE: 75 MMHG | BODY MASS INDEX: 31.01 KG/M2 | HEART RATE: 81 BPM | HEIGHT: 67 IN | SYSTOLIC BLOOD PRESSURE: 130 MMHG

## 2023-10-24 DIAGNOSIS — E11.42 DIABETIC POLYNEUROPATHY ASSOCIATED WITH TYPE 2 DIABETES MELLITUS (HCC): Primary | ICD-10-CM

## 2023-10-24 DIAGNOSIS — Z86.31 PERSONAL HISTORY OF DIABETIC FOOT ULCER: ICD-10-CM

## 2023-10-24 PROCEDURE — 99213 OFFICE O/P EST LOW 20 MIN: CPT | Performed by: PODIATRIST

## 2023-10-24 RX ORDER — AMMONIUM LACTATE 12 G/100G
LOTION TOPICAL 2 TIMES DAILY PRN
Qty: 225 G | Refills: 10 | Status: SHIPPED | OUTPATIENT
Start: 2023-10-24

## 2023-10-24 NOTE — PROGRESS NOTES
This patient was seen on 10/24/23. My role is Foot , Ankle, and Wound Specialist    SUBJECTIVE    Chief Complaint:  Diabetic foot disease     Patient ID: Jamar Gallegos is a 66 y.o. female. Kendall Momin presents for follow-up of her recent diabetic foot disease. She denies any drainage, ulcers currently. She denies pain, fevers, malaise. The following portions of the patient's history were reviewed and updated as appropriate: allergies, current medications, past family history, past medical history, past social history, past surgical history and problem list.    Review of Systems   Constitutional: Negative. Skin:  Negative for wound. Neurological:  Positive for numbness. OBJECTIVE      /75   Pulse 81   Ht 5' 7" (1.702 m)   Wt 89.6 kg (197 lb 9.6 oz)   BMI 30.95 kg/m²     Foot/Ankle Musculoskeletal Exam    Neurovascular    Right      Dorsalis pedis: 2+      Posterior tibial: 2+    Left      Dorsalis pedis: 2+      Posterior tibial: 2+    General    Neurological: alert       Physical Exam  Vitals and nursing note reviewed. Constitutional:       General: She is not in acute distress. Appearance: Normal appearance. She is not ill-appearing, toxic-appearing or diaphoretic. HENT:      Head: Normocephalic and atraumatic. Cardiovascular:      Pulses:           Dorsalis pedis pulses are 2+ on the right side and 2+ on the left side. Posterior tibial pulses are 2+ on the right side and 2+ on the left side. Pulmonary:      Effort: Pulmonary effort is normal.      Breath sounds: Normal breath sounds. Feet:      Right foot:      Protective Sensation: 10 sites tested. 0 sites sensed. Left foot:      Protective Sensation: 10 sites tested. 0 sites sensed. Skin:     Capillary Refill: Capillary refill takes less than 2 seconds.       Comments: Wound # 1  Location: Left lateral forefoot (5th metatarsal head)  HEALED    There are multiple areas of dry, hyperkeratotic skin on both feet. Neurological:      Mental Status: She is alert. ASSESSMENT     Diagnoses and all orders for this visit:    Diabetic polyneuropathy associated with type 2 diabetes mellitus ([address])    Personal history of diabetic foot ulcer         Problem List Items Addressed This Visit          Endocrine    Diabetic neuropathy ([address]) - Primary       Other    Personal history of diabetic foot ulcer           PLAN    Foot precautions reviewed with patient including the need to wear protective shoegear at all times when walking including in the home, the need to check feet all surfaces daily with a mirror and report and skin breaks to podiatry, the need to apply an emollient to skin of feet daily.

## 2023-12-20 ENCOUNTER — PROCEDURE VISIT (OUTPATIENT)
Dept: PODIATRY | Facility: CLINIC | Age: 63
End: 2023-12-20
Payer: COMMERCIAL

## 2023-12-20 VITALS
HEIGHT: 67 IN | HEART RATE: 90 BPM | DIASTOLIC BLOOD PRESSURE: 70 MMHG | BODY MASS INDEX: 30.76 KG/M2 | WEIGHT: 196 LBS | SYSTOLIC BLOOD PRESSURE: 125 MMHG

## 2023-12-20 DIAGNOSIS — B35.1 ONYCHOMYCOSIS: ICD-10-CM

## 2023-12-20 DIAGNOSIS — L84 CORNS AND CALLOSITIES: Primary | ICD-10-CM

## 2023-12-20 DIAGNOSIS — M14.672 CHARCOT'S JOINT OF LEFT FOOT: ICD-10-CM

## 2023-12-20 DIAGNOSIS — E11.42 DIABETIC POLYNEUROPATHY ASSOCIATED WITH TYPE 2 DIABETES MELLITUS (HCC): ICD-10-CM

## 2023-12-20 PROBLEM — S92.425D CLOSED NONDISPLACED FRACTURE OF DISTAL PHALANX OF LEFT GREAT TOE WITH ROUTINE HEALING: Status: RESOLVED | Noted: 2021-05-03 | Resolved: 2023-12-20

## 2023-12-20 PROCEDURE — 11721 DEBRIDE NAIL 6 OR MORE: CPT | Performed by: PODIATRIST

## 2023-12-20 PROCEDURE — 11056 PARNG/CUTG B9 HYPRKR LES 2-4: CPT | Performed by: PODIATRIST

## 2023-12-20 NOTE — PROGRESS NOTES
"Date Patient Seen: 12/20/23       Subjective:     Chief Complaint:  Diabetic Foot Care     Patient ID: Kathya Tong is a 63 y.o. female.    Rajni presents for follow-up of her recent diabetic foot disease. She denies any drainage, ulcers currently but has prior history of Charcot and foot ulcers. She denies pain, fevers, malaise.         The following portions of the patient's history were reviewed and updated as appropriate: allergies, current medications, past family history, past medical history, past social history, past surgical history and problem list.    Review of Systems   Constitutional: Negative.    Skin:  Negative for wound.   Neurological:  Positive for numbness.         Objective:      /70   Pulse 90   Ht 5' 7\" (1.702 m)   Wt 88.9 kg (196 lb)   BMI 30.70 kg/m²        Physical Exam  Vitals and nursing note reviewed.   Constitutional:       General: She is not in acute distress.     Appearance: Normal appearance. She is not ill-appearing, toxic-appearing or diaphoretic.   HENT:      Head: Normocephalic and atraumatic.   Cardiovascular:      Pulses:           Dorsalis pedis pulses are 2+ on the right side and 2+ on the left side.        Posterior tibial pulses are 2+ on the right side and 2+ on the left side.   Pulmonary:      Effort: Pulmonary effort is normal.      Breath sounds: Normal breath sounds.   Feet:      Right foot:      Protective Sensation: 10 sites tested.  0 sites sensed.      Left foot:      Protective Sensation: 10 sites tested.  0 sites sensed.   Skin:     Capillary Refill: Capillary refill takes less than 2 seconds.      Comments: Nails yellow-brown, 5mm thick, dystrophic, with crumbling subugunal debris x 10.        D    There are multiple areas of dry, hyperkeratotic skin on both feet.    Neurological:      Mental Status: She is alert.            Diagnoses and all orders for this visit:    Diabetic polyneuropathy associated with type 2 diabetes mellitus (HCC)  -     " "Diabetic Shoe Inserts  -     Diabetic Shoe    Charcot's joint of left foot  -     Diabetic Shoe Inserts  -     Diabetic Shoe    Onychomycosis    Corns and callosities         Plan:  Foot precautions reviewed with patient including the need to wear protective shoegear at all times when walking including in the home, the need to check feet all surfaces daily with a mirror and report and skin breaks to podiatry, the need to apply an emollient to skin of feet daily. Nails x 10 were debrided with double-action nail forceps of their thickness, length and width.     Callous x 2 removed with scalpel.     Lesion Destruction    Date/Time: 12/20/2023 8:00 AM    Performed by: Crater Gamboa DPM  Authorized by: Carter Gamboa DPM  Universal Protocol:  Consent: Verbal consent obtained.  Risks and benefits: risks, benefits and alternatives were discussed  Consent given by: patient  Time out: Immediately prior to procedure a \"time out\" was called to verify the correct patient, procedure, equipment, support staff and site/side marked as required.  Timeout called at: 12/20/2023 8:18 AM.  Patient understanding: patient states understanding of the procedure being performed  Patient identity confirmed: verbally with patient    Procedure Details - Lesion Destruction:     Number of Lesions:  2  Lesion 1:     Body area:  Lower extremity    Lower extremity location:  R foot    Malignancy: benign hyperkeratotic lesion      Destruction method: scissors used for extraction    Lesion 2:     Body area:  Lower extremity    Lower extremity location:  L foot    Malignancy: benign hyperkeratotic lesion      Destruction method: scissors used for extraction          I recommend continued daily application of urea compound. I wrote an Rx for new diabetic shoegear today. She will continue regular care with Dr. Lutz going forward.  "

## 2024-02-14 ENCOUNTER — PROCEDURE VISIT (OUTPATIENT)
Dept: PODIATRY | Facility: CLINIC | Age: 64
End: 2024-02-14
Payer: COMMERCIAL

## 2024-02-14 VITALS
DIASTOLIC BLOOD PRESSURE: 76 MMHG | SYSTOLIC BLOOD PRESSURE: 135 MMHG | WEIGHT: 194 LBS | BODY MASS INDEX: 30.45 KG/M2 | HEIGHT: 67 IN

## 2024-02-14 DIAGNOSIS — L85.1 ACQUIRED KERATODERMA: ICD-10-CM

## 2024-02-14 DIAGNOSIS — B35.1 ONYCHOMYCOSIS: ICD-10-CM

## 2024-02-14 DIAGNOSIS — E11.42 DIABETIC POLYNEUROPATHY ASSOCIATED WITH TYPE 2 DIABETES MELLITUS (HCC): Primary | ICD-10-CM

## 2024-02-14 DIAGNOSIS — M14.672 CHARCOT'S JOINT OF LEFT FOOT: ICD-10-CM

## 2024-02-14 PROCEDURE — 11721 DEBRIDE NAIL 6 OR MORE: CPT | Performed by: PODIATRIST

## 2024-02-14 PROCEDURE — 11056 PARNG/CUTG B9 HYPRKR LES 2-4: CPT | Performed by: PODIATRIST

## 2024-02-14 NOTE — PROGRESS NOTES
PATIENT:  Kathya Tong  1960    ASSESSMENT/PLAN:  1. Diabetic polyneuropathy associated with type 2 diabetes mellitus (HCC)        2. Charcot's joint of left foot        3. Onychomycosis  Debridement      4. Acquired keratoderma  Lesion Destruction            Orders Placed This Encounter   Procedures    Debridement    Lesion Destruction      Disease prevention and related risk factors of diabetes were identified and discussed.  The patient was educated in proper foot wear for diabetics.  Also educated in daily foot assessment and routine diabetic foot care.  Discussed the importance of controlling BS through diet and exercise.  The patient will follow up in 10 weeks for further diabetic foot exam and care.      PROCEDURE:  All mycotic toenails were reduced and debrided in length, width, and girth using a nail nipper and dremel.  All hyperkeratotic skin lesion(s) were sharply pared with a scalpel / forcep with no bleeding or evidence of ulceration.  Patient tolerated procedure(s) well without complications.      HPI:  Kathya Tong is a 63 y.o.year old female seen for diabetic foot exam.  She has high risk diabetic foot with history of DFU and Charcot arthropathy.  BS is under control.  She has numbness and paresthesia in her feet due to neuropathy.  No active ulcer, but she has calluses.  The patient denied any acute pedal disorder, redness, acute swelling, or recent injury.        PAST MEDICAL HISTORY:  Past Medical History:   Diagnosis Date    Charcot ankle     Diabetes mellitus (HCC)     RA (rheumatoid arthritis) ([facility]) 11/2021       PAST SURGICAL HISTORY:  History reviewed. No pertinent surgical history.     ALLERGIES:  Cefaclor, Metformin, and Penicillins    MEDICATIONS:  Current Outpatient Medications   Medication Sig Dispense Refill    ammonium lactate (LAC-HYDRIN) 12 % lotion Apply topically 2 (two) times a day as needed for dry skin 225 g 10    calcitonin, salmon, (MIACALCIN) 200 units/act nasal  "spray 1 spray into each nostril daily 3.7 mL 1    DULoxetine (CYMBALTA) 30 mg delayed release capsule 60 mg      folic acid (FOLVITE) 1 mg tablet Take 1,000 mcg by mouth daily      glyBURIDE (DIABETA) 2.5 mg tablet TAKE 1 TABLET DAILY WITH BREAKFAST OR THE FIRST MAIN MEAL OF THE DAY      hydroxychloroquine (PLAQUENIL) 200 mg tablet Take 200 mg by mouth 2 (two) times a day      losartan (COZAAR) 25 mg tablet Take by mouth      meloxicam (MOBIC) 7.5 mg tablet TAKE 1 TABLET DAILY-- CAN INCREASE TO 2 TABLETS DAILY IF NEEDED      methotrexate 2.5 mg tablet 25mg weekly per pt      predniSONE 5 mg tablet Take 5 mg by mouth daily      sertraline (ZOLOFT) 100 mg tablet       sertraline (ZOLOFT) 50 mg tablet Take 50 mg by mouth daily      simvastatin (ZOCOR) 5 MG tablet        No current facility-administered medications for this visit.       SOCIAL HISTORY:  Social History     Socioeconomic History    Marital status: /Civil Union     Spouse name: None    Number of children: None    Years of education: None    Highest education level: None   Occupational History    None   Tobacco Use    Smoking status: Never    Smokeless tobacco: Never   Vaping Use    Vaping status: Never Used   Substance and Sexual Activity    Alcohol use: Never    Drug use: Never    Sexual activity: None   Other Topics Concern    None   Social History Narrative    None     Social Determinants of Health     Financial Resource Strain: Not on file   Food Insecurity: Not on file   Transportation Needs: Not on file   Physical Activity: Not on file   Stress: Not on file   Social Connections: Not on file   Intimate Partner Violence: Not on file   Housing Stability: Not on file        REVIEW OF SYSTEMS:  GENERAL: No fever or chills  HEART: No chest pain, or palpitation  RESPIRATORY:  No acute SOB or cough  GI: No Nausea, vomit or diarrhea  NEUROLOGIC: No syncope or acute weakness    PHYSICAL EXAM:    /76   Ht 5' 7\" (1.702 m)   Wt 88 kg (194 lb)   " BMI 30.38 kg/m²     VASCULAR EXAM  Dorsalis pedis  +1, Posterior tibial artery  +1.  The patient has class findings with skin atrophy, lack of digital hair, and nail dystrophy.  There is trace lower extremity edema bilaterally.  Venous stasis skin changes noted BLE.    NEUROLOGIC EXAM  Sensation is intact to light touch.  Sensation is intact to 10gm monofilament.  No focal neurologic deficit.          DERMATOLOGIC EXAM:   No ulcer or cellulitis noted.  The patient has hypertrophic toenails with discoloration, onycholysis, and subungal debris.   Patient has pre-ulcerative callus X 2 in right hallux and left submet 5.      MUSCULOSKELETAL EXAM:   No acute joint pain, edema, or redness.  No acute musculoskeletal problem.  Chronic Charcot foot deformity on left side.

## 2024-03-28 ENCOUNTER — APPOINTMENT (EMERGENCY)
Dept: CT IMAGING | Facility: HOSPITAL | Age: 64
End: 2024-03-28
Attending: EMERGENCY MEDICINE
Payer: COMMERCIAL

## 2024-03-28 ENCOUNTER — HOSPITAL ENCOUNTER (EMERGENCY)
Facility: HOSPITAL | Age: 64
Discharge: HOME | End: 2024-03-28
Attending: EMERGENCY MEDICINE
Payer: COMMERCIAL

## 2024-03-28 VITALS
WEIGHT: 188 LBS | HEIGHT: 67 IN | HEART RATE: 105 BPM | DIASTOLIC BLOOD PRESSURE: 90 MMHG | RESPIRATION RATE: 18 BRPM | SYSTOLIC BLOOD PRESSURE: 150 MMHG | OXYGEN SATURATION: 99 % | BODY MASS INDEX: 29.51 KG/M2 | TEMPERATURE: 97.6 F

## 2024-03-28 DIAGNOSIS — K59.00 CONSTIPATION: Primary | ICD-10-CM

## 2024-03-28 LAB
ALBUMIN SERPL BCP-MCNC: 5.3 G/DL (ref 3.5–5)
ALP SERPL-CCNC: 39 U/L (ref 34–104)
ALT SERPL W P-5'-P-CCNC: 30 U/L (ref 7–52)
ANION GAP SERPL CALCULATED.3IONS-SCNC: 10 MMOL/L (ref 4–13)
AST SERPL W P-5'-P-CCNC: 27 U/L (ref 13–39)
BASOPHILS # BLD AUTO: 0.08 THOUSANDS/ÂΜL (ref 0–0.1)
BASOPHILS NFR BLD AUTO: 1 % (ref 0–1)
BILIRUB SERPL-MCNC: 0.75 MG/DL (ref 0.2–1)
BUN SERPL-MCNC: 24 MG/DL (ref 5–25)
CALCIUM SERPL-MCNC: 10.5 MG/DL (ref 8.4–10.2)
CHLORIDE SERPL-SCNC: 101 MMOL/L (ref 96–108)
CO2 SERPL-SCNC: 25 MMOL/L (ref 21–32)
CREAT SERPL-MCNC: 0.75 MG/DL (ref 0.6–1.3)
EOSINOPHIL # BLD AUTO: 0.26 THOUSAND/ÂΜL (ref 0–0.61)
EOSINOPHIL NFR BLD AUTO: 2 % (ref 0–6)
ERYTHROCYTE [DISTWIDTH] IN BLOOD BY AUTOMATED COUNT: 11.5 % (ref 11.6–15.1)
GFR SERPL CREATININE-BSD FRML MDRD: 86 ML/MIN/1.73SQ M
GLUCOSE SERPL-MCNC: 148 MG/DL (ref 65–140)
HCT VFR BLD AUTO: 45.8 % (ref 34.8–46.1)
HGB BLD-MCNC: 15.5 G/DL (ref 11.5–15.4)
IMM GRANULOCYTES # BLD AUTO: 0.04 THOUSAND/UL (ref 0–0.2)
IMM GRANULOCYTES NFR BLD AUTO: 0 % (ref 0–2)
LIPASE SERPL-CCNC: 24 U/L (ref 11–82)
LYMPHOCYTES # BLD AUTO: 1.34 THOUSANDS/ÂΜL (ref 0.6–4.47)
LYMPHOCYTES NFR BLD AUTO: 11 % (ref 14–44)
MCH RBC QN AUTO: 32 PG (ref 26.8–34.3)
MCHC RBC AUTO-ENTMCNC: 33.8 G/DL (ref 31.4–37.4)
MCV RBC AUTO: 95 FL (ref 82–98)
MONOCYTES # BLD AUTO: 0.79 THOUSAND/ÂΜL (ref 0.17–1.22)
MONOCYTES NFR BLD AUTO: 7 % (ref 4–12)
NEUTROPHILS # BLD AUTO: 9.72 THOUSANDS/ÂΜL (ref 1.85–7.62)
NEUTS SEG NFR BLD AUTO: 79 % (ref 43–75)
NRBC BLD AUTO-RTO: 0 /100 WBCS
PLATELET # BLD AUTO: 286 THOUSANDS/UL (ref 149–390)
PMV BLD AUTO: 8.6 FL (ref 8.9–12.7)
POTASSIUM SERPL-SCNC: 4 MMOL/L (ref 3.5–5.3)
PROT SERPL-MCNC: 8.1 G/DL (ref 6.4–8.4)
RBC # BLD AUTO: 4.84 MILLION/UL (ref 3.81–5.12)
SODIUM SERPL-SCNC: 136 MMOL/L (ref 135–147)
WBC # BLD AUTO: 12.23 THOUSAND/UL (ref 4.31–10.16)

## 2024-03-28 PROCEDURE — 80053 COMPREHEN METABOLIC PANEL: CPT | Performed by: EMERGENCY MEDICINE

## 2024-03-28 PROCEDURE — 99285 EMERGENCY DEPT VISIT HI MDM: CPT | Performed by: EMERGENCY MEDICINE

## 2024-03-28 PROCEDURE — 99284 EMERGENCY DEPT VISIT MOD MDM: CPT

## 2024-03-28 PROCEDURE — 74177 CT ABD & PELVIS W/CONTRAST: CPT

## 2024-03-28 PROCEDURE — 85025 COMPLETE CBC W/AUTO DIFF WBC: CPT | Performed by: EMERGENCY MEDICINE

## 2024-03-28 PROCEDURE — 36415 COLL VENOUS BLD VENIPUNCTURE: CPT | Performed by: EMERGENCY MEDICINE

## 2024-03-28 PROCEDURE — 83690 ASSAY OF LIPASE: CPT | Performed by: EMERGENCY MEDICINE

## 2024-03-28 RX ADMIN — IOHEXOL 80 ML: 350 INJECTION, SOLUTION INTRAVENOUS at 07:37

## 2024-03-28 NOTE — ED CARE HANDOFF
Emergency Department Sign Out Note        Sign out and transfer of care from Dr. Robert. See Separate Emergency Department note.     The patient, Kathya Tong, was evaluated by the previous provider for Constipation and rectal pressure.    Workup Completed:  H&P, milk&molassess enema    ED Course / Workup Pending (followup):  Labs, CT A/P    Results for orders placed or performed during the hospital encounter of 03/28/24   CBC and differential   Result Value Ref Range    WBC 12.23 (H) 4.31 - 10.16 Thousand/uL    RBC 4.84 3.81 - 5.12 Million/uL    Hemoglobin 15.5 (H) 11.5 - 15.4 g/dL    Hematocrit 45.8 34.8 - 46.1 %    MCV 95 82 - 98 fL    MCH 32.0 26.8 - 34.3 pg    MCHC 33.8 31.4 - 37.4 g/dL    RDW 11.5 (L) 11.6 - 15.1 %    MPV 8.6 (L) 8.9 - 12.7 fL    Platelets 286 149 - 390 Thousands/uL    nRBC 0 /100 WBCs    Neutrophils Relative 79 (H) 43 - 75 %    Immature Grans % 0 0 - 2 %    Lymphocytes Relative 11 (L) 14 - 44 %    Monocytes Relative 7 4 - 12 %    Eosinophils Relative 2 0 - 6 %    Basophils Relative 1 0 - 1 %    Neutrophils Absolute 9.72 (H) 1.85 - 7.62 Thousands/µL    Absolute Immature Grans 0.04 0.00 - 0.20 Thousand/uL    Absolute Lymphocytes 1.34 0.60 - 4.47 Thousands/µL    Absolute Monocytes 0.79 0.17 - 1.22 Thousand/µL    Eosinophils Absolute 0.26 0.00 - 0.61 Thousand/µL    Basophils Absolute 0.08 0.00 - 0.10 Thousands/µL   Comprehensive metabolic panel   Result Value Ref Range    Sodium 136 135 - 147 mmol/L    Potassium 4.0 3.5 - 5.3 mmol/L    Chloride 101 96 - 108 mmol/L    CO2 25 21 - 32 mmol/L    ANION GAP 10 4 - 13 mmol/L    BUN 24 5 - 25 mg/dL    Creatinine 0.75 0.60 - 1.30 mg/dL    Glucose 148 (H) 65 - 140 mg/dL    Calcium 10.5 (H) 8.4 - 10.2 mg/dL    AST 27 13 - 39 U/L    ALT 30 7 - 52 U/L    Alkaline Phosphatase 39 34 - 104 U/L    Total Protein 8.1 6.4 - 8.4 g/dL    Albumin 5.3 (H) 3.5 - 5.0 g/dL    Total Bilirubin 0.75 0.20 - 1.00 mg/dL    eGFR 86 ml/min/1.73sq m   Lipase   Result Value  Ref Range    Lipase 24 11 - 82 u/L     CT abdomen pelvis with contrast   Final Result      Large colonic stool burden without bowel obstruction.         Workstation performed: [id number]           Discussed the results of the diagnostics.  We discussed a bowel regimen that the patient can complete at home to facilitate bowel movements.  Patient and daughter are agreeable with plan    The patient (and any family present) verbalized understanding of the discharge instructions and warnings that would necessitate return to the Emergency Department.    All questions were answered prior to discharge.                                   Procedures  Medical Decision Making  Amount and/or Complexity of Data Reviewed  Labs: ordered.  Radiology: ordered.    Risk  Prescription drug management.            Disposition  Final diagnoses:   Constipation     Time reflects when diagnosis was documented in both MDM as applicable and the Disposition within this note       Time User Action Codes Description Comment    3/28/2024  8:05 AM Danilo Cotton Add [K59.00] Constipation           ED Disposition       ED Disposition   Discharge    Condition   Stable    Date/Time   Thu Mar 28, 2024  8:05 AM    Comment   Kathya Tong discharge to home/self care.                   Follow-up Information       Follow up With Specialties Details Why Contact Info Additional Information    Addi Riggs MD  Schedule an appointment as soon as possible for a visit in 1 week For further evaluation, if not improved [address] [place] [address] 2 [facility] [address]  [phone]       [facility] Schedule an appointment as soon as possible for a visit  For further evaluation, if not improved [address] [facility]  [address]  [facility] [address]  [phone] [facility] [place], [facility], [address], [facility]  [address]     [phone]           Current Discharge Medication List        CONTINUE these medications which have NOT CHANGED    Details   ammonium lactate (LAC-HYDRIN) 12 % lotion Apply topically 2 (two) times a day as needed for dry skin  Qty: 225 g, Refills: 10    Associated Diagnoses: Diabetic polyneuropathy associated with type 2 diabetes mellitus (HCC); Personal history of diabetic foot ulcer      calcitonin, salmon, (MIACALCIN) 200 units/act nasal spray 1 spray into each nostril daily  Qty: 3.7 mL, Refills: 1    Associated Diagnoses: Charcot's joint of left foot      DULoxetine (CYMBALTA) 30 mg delayed release capsule 60 mg      folic acid (FOLVITE) 1 mg tablet Take 1,000 mcg by mouth daily      glyBURIDE (DIABETA) 2.5 mg tablet TAKE 1 TABLET DAILY WITH BREAKFAST OR THE FIRST MAIN MEAL OF THE DAY      hydroxychloroquine (PLAQUENIL) 200 mg tablet Take 200 mg by mouth 2 (two) times a day      losartan (COZAAR) 25 mg tablet Take by mouth      meloxicam (MOBIC) 7.5 mg tablet TAKE 1 TABLET DAILY-- CAN INCREASE TO 2 TABLETS DAILY IF NEEDED      methotrexate 2.5 mg tablet 25mg weekly per pt      predniSONE 5 mg tablet Take 5 mg by mouth daily      !! sertraline (ZOLOFT) 100 mg tablet       !! sertraline (ZOLOFT) 50 mg tablet Take 50 mg by mouth daily      simvastatin (ZOCOR) 5 MG tablet        !! - Potential duplicate medications found. Please discuss with provider.        No discharge procedures on file.       ED Provider  Electronically Signed by     Danilo Cotton DO  03/28/24 0892

## 2024-03-28 NOTE — ED PROVIDER NOTES
History  Chief Complaint   Patient presents with    Constipation     Feeling of needing to have a BM since 12:45 this AM. Denies abd pain     Patient is a 63-year-old female who presents with constipation.  Patient states that the last time that she had a bowel movement was on Tuesday.  She states that she previously drank 32 ounces of coffee daily, but she stopped drinking coffee and is now having issues with constipation.  She did have a small bowel movement this morning, but still feels like there is a large amount of stool that is pushing on her rectum and like she did not completely evacuate.  She denies any nausea, vomiting, abdominal pain or pressure.        Prior to Admission Medications   Prescriptions Last Dose Informant Patient Reported? Taking?   DULoxetine (CYMBALTA) 30 mg delayed release capsule  Self Yes No   Si mg   ammonium lactate (LAC-HYDRIN) 12 % lotion   No No   Sig: Apply topically 2 (two) times a day as needed for dry skin   calcitonin, salmon, (MIACALCIN) 200 units/act nasal spray  Self No No   Si spray into each nostril daily   folic acid (FOLVITE) 1 mg tablet  Self Yes No   Sig: Take 1,000 mcg by mouth daily   glyBURIDE (DIABETA) 2.5 mg tablet  Self Yes No   Sig: TAKE 1 TABLET DAILY WITH BREAKFAST OR THE FIRST MAIN MEAL OF THE DAY   hydroxychloroquine (PLAQUENIL) 200 mg tablet  Self Yes No   Sig: Take 200 mg by mouth 2 (two) times a day   losartan (COZAAR) 25 mg tablet  Self Yes No   Sig: Take by mouth   meloxicam (MOBIC) 7.5 mg tablet  Self Yes No   Sig: TAKE 1 TABLET DAILY-- CAN INCREASE TO 2 TABLETS DAILY IF NEEDED   methotrexate 2.5 mg tablet  Self Yes No   Simg weekly per pt   predniSONE 5 mg tablet  Self Yes No   Sig: Take 5 mg by mouth daily   sertraline (ZOLOFT) 100 mg tablet  Self Yes No   sertraline (ZOLOFT) 50 mg tablet  Self Yes No   Sig: Take 50 mg by mouth daily   simvastatin (ZOCOR) 5 MG tablet  Self Yes No      Facility-Administered Medications: None       Past  Medical History:   Diagnosis Date    Charcot ankle     Diabetes mellitus (HCC)     RA (rheumatoid arthritis) (HCC) 11/2021       History reviewed. No pertinent surgical history.    Family History   Problem Relation Age of Onset    Hypotension Mother      I have reviewed and agree with the history as documented.    E-Cigarette/Vaping    E-Cigarette Use Never User      E-Cigarette/Vaping Substances    Nicotine No     THC No     CBD No     Flavoring No     Other No     Unknown No      Social History     Tobacco Use    Smoking status: Never    Smokeless tobacco: Never   Vaping Use    Vaping status: Never Used   Substance Use Topics    Alcohol use: Never    Drug use: Never       Review of Systems   Respiratory:  Negative for shortness of breath.    Cardiovascular:  Negative for chest pain.   Gastrointestinal:  Positive for constipation. Negative for abdominal distention, abdominal pain, blood in stool, diarrhea, nausea and vomiting.   Genitourinary:  Negative for dysuria, flank pain and hematuria.   Musculoskeletal:  Negative for back pain.       Physical Exam  Physical Exam  Vitals and nursing note reviewed.   Constitutional:       General: She is not in acute distress.     Appearance: Normal appearance. She is not ill-appearing, toxic-appearing or diaphoretic.   HENT:      Head: Normocephalic and atraumatic.      Mouth/Throat:      Mouth: Mucous membranes are moist.   Eyes:      Conjunctiva/sclera: Conjunctivae normal.      Pupils: Pupils are equal, round, and reactive to light.   Cardiovascular:      Rate and Rhythm: Normal rate and regular rhythm.   Pulmonary:      Effort: Pulmonary effort is normal. No respiratory distress.      Breath sounds: Normal breath sounds. No stridor. No wheezing, rhonchi or rales.   Chest:      Chest wall: No tenderness.   Abdominal:      General: Bowel sounds are normal. There is no distension.      Palpations: Abdomen is soft.      Tenderness: There is no abdominal tenderness. There is  no guarding or rebound.   Skin:     General: Skin is warm and dry.   Neurological:      General: No focal deficit present.      Mental Status: She is alert and oriented to person, place, and time. Mental status is at baseline.         Vital Signs  ED Triage Vitals [03/28/24 0617]   Temperature Pulse Respirations Blood Pressure SpO2   97.6 °F (36.4 °C) (!) 111 16 (!) 171/90 100 %      Temp Source Heart Rate Source Patient Position - Orthostatic VS BP Location FiO2 (%)   Oral Monitor Sitting Right arm --      Pain Score       No Pain           Vitals:    03/28/24 0630 03/28/24 0700 03/28/24 0730 03/28/24 0800   BP: 162/78 153/73 141/70 150/90   Pulse: (!) 109 (!) 109 103 105   Patient Position - Orthostatic VS: Sitting Sitting Sitting Sitting         Visual Acuity      ED Medications  Medications   iohexol (OMNIPAQUE) 350 MG/ML injection (MULTI-DOSE) 80 mL (80 mL Intravenous Given 3/28/24 0737)       Diagnostic Studies  Results Reviewed       Procedure Component Value Units Date/Time    Comprehensive metabolic panel [[id number]]  (Abnormal) Collected: 03/28/24 0702    Lab Status: Final result Specimen: Blood from Arm, Right Updated: 03/28/24 0723     Sodium 136 mmol/L      Potassium 4.0 mmol/L      Chloride 101 mmol/L      CO2 25 mmol/L      ANION GAP 10 mmol/L      BUN 24 mg/dL      Creatinine 0.75 mg/dL      Glucose 148 mg/dL      Calcium 10.5 mg/dL      AST 27 U/L      ALT 30 U/L      Alkaline Phosphatase 39 U/L      Total Protein 8.1 g/dL      Albumin 5.3 g/dL      Total Bilirubin 0.75 mg/dL      eGFR 86 ml/min/1.73sq m     Narrative:      National Kidney Disease Foundation guidelines for Chronic Kidney Disease (CKD):     Stage 1 with normal or high GFR (GFR > 90 mL/min/1.73 square meters)    Stage 2 Mild CKD (GFR = 60-89 mL/min/1.73 square meters)    Stage 3A Moderate CKD (GFR = 45-59 mL/min/1.73 square meters)    Stage 3B Moderate CKD (GFR = 30-44 mL/min/1.73 square meters)    Stage 4 Severe CKD (GFR = 15-29  mL/min/1.73 square meters)    Stage 5 End Stage CKD (GFR <15 mL/min/1.73 square meters)  Note: GFR calculation is accurate only with a steady state creatinine    Lipase [[id number]]  (Normal) Collected: 03/28/24 0702    Lab Status: Final result Specimen: Blood from Arm, Right Updated: 03/28/24 0723     Lipase 24 u/L     CBC and differential [[id number]]  (Abnormal) Collected: 03/28/24 0702    Lab Status: Final result Specimen: Blood from Arm, Right Updated: 03/28/24 0707     WBC 12.23 Thousand/uL      RBC 4.84 Million/uL      Hemoglobin 15.5 g/dL      Hematocrit 45.8 %      MCV 95 fL      MCH 32.0 pg      MCHC 33.8 g/dL      RDW 11.5 %      MPV 8.6 fL      Platelets 286 Thousands/uL      nRBC 0 /100 WBCs      Neutrophils Relative 79 %      Immature Grans % 0 %      Lymphocytes Relative 11 %      Monocytes Relative 7 %      Eosinophils Relative 2 %      Basophils Relative 1 %      Neutrophils Absolute 9.72 Thousands/µL      Absolute Immature Grans 0.04 Thousand/uL      Absolute Lymphocytes 1.34 Thousands/µL      Absolute Monocytes 0.79 Thousand/µL      Eosinophils Absolute 0.26 Thousand/µL      Basophils Absolute 0.08 Thousands/µL                    CT abdomen pelvis with contrast   Final Result by Santiago Ramirez MD (03/28 8143)      Large colonic stool burden without bowel obstruction.         Workstation performed: [id number]                    Procedures  Procedures         ED Course  ED Course as of 03/29/24 0810   Thu Mar 28, 2024   0655 Patient care signed out to Dr. Cotton. Patient is 62 yo F who p/w constipation, sensation of fecal impaction. MOM enema attempted, but still feels like cannot have normal BM. Patient is now amenable to labs and CT AP to r/o SBO before further attempts at constipation relief.                               SBIRT 22yo+      Flowsheet Row Most Recent Value   Initial Alcohol Screen: US AUDIT-C     1. How often do you have a drink containing alcohol? 0 Filed at: 03/28/2024 0616   2.  How many drinks containing alcohol do you have on a typical day you are drinking?  0 Filed at: 03/28/2024 0616   3a. Male UNDER 65: How often do you have five or more drinks on one occasion? 0 Filed at: 03/28/2024 0616   3b. FEMALE Any Age, or MALE 65+: How often do you have 4 or more drinks on one occassion? 0 Filed at: 03/28/2024 0616   Audit-C Score 0 Filed at: 03/28/2024 0616   BECKY: How many times in the past year have you...    Used an illegal drug or used a prescription medication for non-medical reasons? Never Filed at: 03/28/2024 0616                      Medical Decision Making  Assessment and plan:  Constipation v. SBO.  Had a shared decision-making discussion with the patient in regards to being unable to rule out a small bowel obstruction without getting CT imaging.  Patient states that she has had issues like this in the past and has never had a bowel obstruction and it is always constipation.  I reviewed with the patient the risks of missing/not diagnosing a small bowel obstruction including perforation, needing to have surgery, sepsis, permanent disability which patient verbalized understanding of.  Patient prefers to attempt a bedside enema at this time and if symptoms relieved, would like to be discharged at that time.    Amount and/or Complexity of Data Reviewed  Labs: ordered.  Radiology: ordered.    Risk  Prescription drug management.             Disposition  Final diagnoses:   Constipation     Time reflects when diagnosis was documented in both MDM as applicable and the Disposition within this note       Time User Action Codes Description Comment    3/28/2024  8:05 AM Danilo Cotton Add [K59.00] Constipation           ED Disposition       ED Disposition   Discharge    Condition   Stable    Date/Time   Thu Mar 28, 2024 0805    Comment   Kathya Tong discharge to home/self care.                   Follow-up Information       Follow up With Specialties Details Why Contact Info Additional  Information    Addi Riggs MD  Schedule an appointment as soon as possible for a visit in 1 week For further evaluation, if not improved [address] [facility], [facility] [address] [facility] [address]  [phone]       [facility] [place] [facility] Schedule an appointment as soon as possible for a visit  For further evaluation, if not improved [address]  [address]  [facility] [id number] [phone] [facility] [place], [address], [address], [facility]  [address] [phone]            Discharge Medication List as of 3/28/2024  8:06 AM        CONTINUE these medications which have NOT CHANGED    Details   ammonium lactate (LAC-HYDRIN) 12 % lotion Apply topically 2 (two) times a day as needed for dry skin, Starting Tue 10/24/2023, Normal      calcitonin, salmon, (MIACALCIN) 200 units/act nasal spray 1 spray into each nostril daily, Starting Mon 1/25/2021, Normal      DULoxetine (CYMBALTA) 30 mg delayed release capsule 60 mg, Historical Med      folic acid (FOLVITE) 1 mg tablet Take 1,000 mcg by mouth daily, Starting Sat 11/13/2021, Historical Med      glyBURIDE (DIABETA) 2.5 mg tablet TAKE 1 TABLET DAILY WITH BREAKFAST OR THE FIRST MAIN MEAL OF THE DAY, Historical Med      hydroxychloroquine (PLAQUENIL) 200 mg tablet Take 200 mg by mouth 2 (two) times a day, Starting Wed 12/1/2021, Historical Med      losartan (COZAAR) 25 mg tablet Take by mouth, Historical Med      meloxicam (MOBIC) 7.5 mg tablet TAKE 1 TABLET DAILY-- CAN INCREASE TO 2 TABLETS DAILY IF NEEDED, Historical Med      methotrexate 2.5 mg tablet 25mg weekly per pt, Starting Mon 11/15/2021, Historical Med      predniSONE 5 mg tablet Take 5 mg by mouth daily, Starting Wed 12/1/2021, Historical Med      !! sertraline (ZOLOFT) 100 mg tablet Starting u 4/1/2021, Historical Med      !! sertraline (ZOLOFT) 50 mg tablet Take 50 mg by mouth daily,  Historical Med      simvastatin (ZOCOR) 5 MG tablet Starting Thu 12/19/2019, Historical Med       !! - Potential duplicate medications found. Please discuss with provider.          No discharge procedures on file.    PDMP Review       None            ED Provider  Electronically Signed by             Estefani Robert DO  03/29/24 0800

## 2024-03-28 NOTE — DISCHARGE INSTRUCTIONS
Bowel Regiment  MiraLAX: 3 times a day for 3 days, then once daily  Colace: 1 pill twice a day  Citrucel: As directed on the bottle  You should drink at least 1.5 L of water per day    If you have been taking narcotic pain medication you should add:  Senokot: 2 tablets at bedtime

## 2024-04-24 ENCOUNTER — OFFICE VISIT (OUTPATIENT)
Dept: PODIATRY | Facility: CLINIC | Age: 64
End: 2024-04-24
Payer: COMMERCIAL

## 2024-04-24 VITALS
BODY MASS INDEX: 28.72 KG/M2 | SYSTOLIC BLOOD PRESSURE: 149 MMHG | HEIGHT: 67 IN | DIASTOLIC BLOOD PRESSURE: 87 MMHG | WEIGHT: 183 LBS | HEART RATE: 88 BPM

## 2024-04-24 DIAGNOSIS — L85.1 ACQUIRED KERATODERMA: ICD-10-CM

## 2024-04-24 DIAGNOSIS — M14.672 CHARCOT'S JOINT OF LEFT FOOT: ICD-10-CM

## 2024-04-24 DIAGNOSIS — E11.42 DIABETIC POLYNEUROPATHY ASSOCIATED WITH TYPE 2 DIABETES MELLITUS (HCC): Primary | ICD-10-CM

## 2024-04-24 DIAGNOSIS — B35.1 ONYCHOMYCOSIS: ICD-10-CM

## 2024-04-24 PROCEDURE — 11056 PARNG/CUTG B9 HYPRKR LES 2-4: CPT | Performed by: PODIATRIST

## 2024-04-24 PROCEDURE — 11721 DEBRIDE NAIL 6 OR MORE: CPT | Performed by: PODIATRIST

## 2024-04-24 RX ORDER — ORAL SEMAGLUTIDE 14 MG/1
TABLET ORAL
COMMUNITY

## 2024-04-24 RX ORDER — ETANERCEPT 50 MG/ML
SOLUTION SUBCUTANEOUS
COMMUNITY
Start: 2024-02-10

## 2024-04-24 RX ORDER — ORAL SEMAGLUTIDE 7 MG/1
TABLET ORAL
COMMUNITY
Start: 2023-11-24

## 2024-04-24 RX ORDER — MEDROXYPROGESTERONE ACETATE 150 MG/ML
INJECTION, SUSPENSION INTRAMUSCULAR
COMMUNITY
Start: 2024-04-24

## 2024-04-24 NOTE — PROGRESS NOTES
PATIENT:  Kathya Tong  1960    ASSESSMENT/PLAN:  1. Diabetic polyneuropathy associated with type 2 diabetes mellitus (HCC)        2. Acquired keratoderma  Lesion Destruction      3. Onychomycosis  Debridement      4. Charcot's joint of left foot  CANCELED: Lesion Destruction              Orders Placed This Encounter   Procedures    Debridement    Lesion Destruction        Disease prevention and related risk factors of diabetes were identified and discussed.  The patient was educated in proper foot wear for diabetics.  Also educated in daily foot assessment and routine diabetic foot care.  Discussed the importance of controlling BS through diet and exercise.  Instructed her to call to make an appointment for new DM shoes.  The patient will follow up in 9 weeks for further diabetic foot exam and care.      PROCEDURE:  All mycotic toenails were reduced and debrided in length, width, and girth using a nail nipper and dremel.  All hyperkeratotic skin lesion(s) were sharply pared with a scalpel / forcep with no bleeding or evidence of ulceration.  Patient tolerated procedure(s) well without complications.      HPI:  Kathya Tong is a 63 y.o.year old female seen for diabetic foot exam.  She has high risk diabetic foot with history of DFU and Charcot arthropathy.  BS is under control.  She has numbness and paresthesia in her feet due to neuropathy.  She has calluses.  No active ulcer.  The patient denied any acute pedal disorder, redness, acute swelling, or recent injury.  She has not obtained new DM shoes yet.        PAST MEDICAL HISTORY:  Past Medical History:   Diagnosis Date    Arthritis 11/2021    Charcot ankle     Charcot foot due to diabetes mellitus ([facility]) 2018    Diabetes mellitus ([facility])     Hammer toe 2013    Neuropathy in diabetes ([facility]) 2011    RA (rheumatoid arthritis) ([facility]) 11/2021       PAST SURGICAL HISTORY:  Past Surgical History:   Procedure Laterality Date    APPENDECTOMY  1980     CHOLECYSTECTOMY  1986    CORRECTION HAMMER TOE  2013    FOOT SURGERY  2013        ALLERGIES:  Cefaclor, Metformin, and Penicillins    MEDICATIONS:  Current Outpatient Medications   Medication Sig Dispense Refill    ammonium lactate (LAC-HYDRIN) 12 % lotion Apply topically 2 (two) times a day as needed for dry skin 225 g 10    DULoxetine (CYMBALTA) 30 mg delayed release capsule 60 mg      Enbrel SureClick 50 MG/ML injection       etanercept (Enbrel) 50 mg/mL SOSY 1 mL Subcutaneous      glyBURIDE (DIABETA) 2.5 mg tablet TAKE 1 TABLET DAILY WITH BREAKFAST OR THE FIRST MAIN MEAL OF THE DAY      hydroxychloroquine (PLAQUENIL) 200 mg tablet Take 200 mg by mouth 2 (two) times a day      losartan (COZAAR) 25 mg tablet Take by mouth      predniSONE 5 mg tablet Take 5 mg by mouth daily      Rybelsus 14 MG tablet 1 TABLET DAILY AT LEAST 30 MINUTES BEFORE 1ST FOOD,BEVERAGE, OR OTHER ORAL MEDICINE OF DAY      simvastatin (ZOCOR) 5 MG tablet       calcitonin, salmon, (MIACALCIN) 200 units/act nasal spray 1 spray into each nostril daily 3.7 mL 1    folic acid (FOLVITE) 1 mg tablet Take 1,000 mcg by mouth daily      meloxicam (MOBIC) 7.5 mg tablet TAKE 1 TABLET DAILY-- CAN INCREASE TO 2 TABLETS DAILY IF NEEDED      methotrexate 2.5 mg tablet 25mg weekly per pt      Rybelsus 7 MG tablet  (Patient not taking: Reported on 4/24/2024)      sertraline (ZOLOFT) 100 mg tablet       sertraline (ZOLOFT) 50 mg tablet Take 50 mg by mouth daily       No current facility-administered medications for this visit.       SOCIAL HISTORY:  Social History     Socioeconomic History    Marital status: /Civil Union     Spouse name: None    Number of children: None    Years of education: None    Highest education level: None   Occupational History    None   Tobacco Use    Smoking status: Never    Smokeless tobacco: Never   Vaping Use    Vaping status: Never Used   Substance and Sexual Activity    Alcohol use: Not Currently    Drug use: Never     "Sexual activity: Yes     Partners: Male     Birth control/protection: Post-menopausal, Surgical   Other Topics Concern    None   Social History Narrative    None     Social Determinants of Health     Financial Resource Strain: Not on file   Food Insecurity: Not on file   Transportation Needs: Not on file   Physical Activity: Not on file   Stress: Not on file   Social Connections: Not on file   Intimate Partner Violence: Not on file   Housing Stability: Not on file        REVIEW OF SYSTEMS:  GENERAL: No fever or chills  HEART: No chest pain, or palpitation  RESPIRATORY:  No acute SOB or cough  GI: No Nausea, vomit or diarrhea  NEUROLOGIC: No syncope or acute weakness    PHYSICAL EXAM:    /87 (BP Location: Left arm, Patient Position: Sitting, Cuff Size: Adult)   Pulse 88   Ht 5' 7\" (1.702 m) Comment: STATED  Wt 83 kg (183 lb)   BMI 28.66 kg/m²     VASCULAR EXAM  Dorsalis pedis  +1, Posterior tibial artery  +1.  The patient has class findings with skin atrophy, lack of digital hair, and nail dystrophy.  There is trace lower extremity edema bilaterally.  Venous stasis skin changes noted BLE.    NEUROLOGIC EXAM  Sensation is intact to light touch.  Sensation is intact to 10gm monofilament.  No focal neurologic deficit.          DERMATOLOGIC EXAM:   No ulcer or cellulitis noted.  The patient has hypertrophic toenails with discoloration, onycholysis, and subungal debris.   Patient has pre-ulcerative callus X 2 in right hallux and left submet 5.      MUSCULOSKELETAL EXAM:   No acute joint pain, edema, or redness.  No acute musculoskeletal problem.  Chronic Charcot foot deformity on left side.          "

## 2024-07-03 ENCOUNTER — OFFICE VISIT (OUTPATIENT)
Dept: PODIATRY | Facility: CLINIC | Age: 64
End: 2024-07-03
Payer: COMMERCIAL

## 2024-07-03 VITALS
HEIGHT: 67 IN | HEART RATE: 83 BPM | WEIGHT: 182 LBS | DIASTOLIC BLOOD PRESSURE: 73 MMHG | BODY MASS INDEX: 28.56 KG/M2 | SYSTOLIC BLOOD PRESSURE: 121 MMHG

## 2024-07-03 DIAGNOSIS — L85.1 ACQUIRED KERATODERMA: ICD-10-CM

## 2024-07-03 DIAGNOSIS — E11.42 DIABETIC POLYNEUROPATHY ASSOCIATED WITH TYPE 2 DIABETES MELLITUS (HCC): Primary | ICD-10-CM

## 2024-07-03 DIAGNOSIS — B35.1 ONYCHOMYCOSIS: ICD-10-CM

## 2024-07-03 PROCEDURE — 11721 DEBRIDE NAIL 6 OR MORE: CPT | Performed by: PODIATRIST

## 2024-07-03 PROCEDURE — 11056 PARNG/CUTG B9 HYPRKR LES 2-4: CPT | Performed by: PODIATRIST

## 2024-07-03 NOTE — PROGRESS NOTES
PATIENT:  Kathya Tong  1960    ASSESSMENT/PLAN:  1. Diabetic polyneuropathy associated with type 2 diabetes mellitus (HCC)        2. Acquired keratoderma        3. Onychomycosis                   Disease prevention and related risk factors of diabetes were identified and discussed.  The patient was educated in proper foot wear for diabetics.  Also educated in daily foot assessment and routine diabetic foot care.  Discussed the importance of controlling BS through diet and exercise.  Instructed her to call to make an appointment for new DM shoes.  The patient will follow up in 9 weeks for further diabetic foot exam and care.      PROCEDURE:  All mycotic toenails were reduced and debrided in length, width, and girth using a nail nipper and dremel.  All hyperkeratotic skin lesion(s) were sharply pared with a scalpel / forcep with no bleeding or evidence of ulceration.  Patient tolerated procedure(s) well without complications.      HPI:  Kathya Tong is a 63 y.o.year old female seen for diabetic foot exam.  She has high risk diabetic foot with history of DFU and Charcot arthropathy.  BS is under control.  She has numbness and paresthesia in her feet due to neuropathy.  She has calluses.  No active ulcer.  The patient denied any acute pedal disorder, redness, acute swelling, or recent injury.      PAST MEDICAL HISTORY:  Past Medical History:   Diagnosis Date    Arthritis 11/2021    Charcot ankle     Charcot foot due to diabetes mellitus (HCC) 2018    Diabetes mellitus (HCC)     Hammer toe 2013    Neuropathy in diabetes (HCC) 2011    RA (rheumatoid arthritis) ([facility]) 11/2021       PAST SURGICAL HISTORY:  Past Surgical History:   Procedure Laterality Date    APPENDECTOMY  1980    CHOLECYSTECTOMY  1986    CORRECTION HAMMER TOE  2013    FOOT SURGERY  2013        ALLERGIES:  Cefaclor, Metformin, and Penicillins    MEDICATIONS:  Current Outpatient Medications   Medication Sig Dispense Refill    ammonium lactate  Spoke with patient. She reports no change in symptoms. She states it feels as if something tore in the hip. Discussed further with Lorenzo PEREZ PA-C. He has agreed to provide another prescription. Her MRIs are on the 10th of December. Refill e-prescribed to the pharmacy. Patient verbalized understanding and appreciation.      (LAC-HYDRIN) 12 % lotion Apply topically 2 (two) times a day as needed for dry skin 225 g 10    DULoxetine (CYMBALTA) 30 mg delayed release capsule 60 mg      Enbrel SureClick 50 MG/ML injection       glyBURIDE (DIABETA) 2.5 mg tablet TAKE 1 TABLET DAILY WITH BREAKFAST OR THE FIRST MAIN MEAL OF THE DAY      hydroxychloroquine (PLAQUENIL) 200 mg tablet Take 200 mg by mouth 2 (two) times a day      losartan (COZAAR) 25 mg tablet Take by mouth      Rybelsus 14 MG tablet 1 TABLET DAILY AT LEAST 30 MINUTES BEFORE 1ST FOOD,BEVERAGE, OR OTHER ORAL MEDICINE OF DAY      simvastatin (ZOCOR) 5 MG tablet       calcitonin, salmon, (MIACALCIN) 200 units/act nasal spray 1 spray into each nostril daily 3.7 mL 1    etanercept (Enbrel) 50 mg/mL SOSY 1 mL Subcutaneous      folic acid (FOLVITE) 1 mg tablet Take 1,000 mcg by mouth daily      meloxicam (MOBIC) 7.5 mg tablet TAKE 1 TABLET DAILY-- CAN INCREASE TO 2 TABLETS DAILY IF NEEDED      methotrexate 2.5 mg tablet 25mg weekly per pt      predniSONE 5 mg tablet Take 5 mg by mouth daily      Rybelsus 7 MG tablet  (Patient not taking: Reported on 4/24/2024)      sertraline (ZOLOFT) 100 mg tablet       sertraline (ZOLOFT) 50 mg tablet Take 50 mg by mouth daily       No current facility-administered medications for this visit.       SOCIAL HISTORY:  Social History     Socioeconomic History    Marital status: /Civil Union     Spouse name: None    Number of children: None    Years of education: None    Highest education level: None   Occupational History    None   Tobacco Use    Smoking status: Never    Smokeless tobacco: Never   Vaping Use    Vaping status: Never Used   Substance and Sexual Activity    Alcohol use: Not Currently    Drug use: Never    Sexual activity: Yes     Partners: Male     Birth control/protection: Post-menopausal, Surgical   Other Topics Concern    None   Social History Narrative    None     Social Determinants of Health     Financial Resource Strain: Not on  "file   Food Insecurity: Not on file   Transportation Needs: Not on file   Physical Activity: Not on file   Stress: Not on file   Social Connections: Not on file   Intimate Partner Violence: Not on file   Housing Stability: Not on file        REVIEW OF SYSTEMS:  GENERAL: No fever or chills  HEART: No chest pain, or palpitation  RESPIRATORY:  No acute SOB or cough  GI: No Nausea, vomit or diarrhea  NEUROLOGIC: No syncope or acute weakness    PHYSICAL EXAM:    /73 (BP Location: Left arm, Patient Position: Sitting, Cuff Size: Adult)   Pulse 83   Ht 5' 7\" (1.702 m) Comment: STATED  Wt 82.6 kg (182 lb)   BMI 28.51 kg/m²     VASCULAR EXAM  Dorsalis pedis  +1, Posterior tibial artery  +1.  The patient has class findings with skin atrophy, lack of digital hair, and nail dystrophy.  There is trace lower extremity edema bilaterally.  Venous stasis skin changes noted BLE.    NEUROLOGIC EXAM  Sensation is intact to light touch.  Sensation is intact to 10gm monofilament.  No focal neurologic deficit.          DERMATOLOGIC EXAM:   No ulcer or cellulitis noted.  The patient has hypertrophic toenails with discoloration, onycholysis, and subungal debris.   Patient has pre-ulcerative callus X 2 in right hallux and left submet 5.      MUSCULOSKELETAL EXAM:   No acute joint pain, edema, or redness.  No acute musculoskeletal problem.  Chronic Charcot foot deformity on left side.          "

## 2024-09-04 ENCOUNTER — OFFICE VISIT (OUTPATIENT)
Dept: PODIATRY | Facility: CLINIC | Age: 64
End: 2024-09-04
Payer: COMMERCIAL

## 2024-09-04 VITALS
DIASTOLIC BLOOD PRESSURE: 72 MMHG | SYSTOLIC BLOOD PRESSURE: 126 MMHG | BODY MASS INDEX: 29.03 KG/M2 | WEIGHT: 185 LBS | HEIGHT: 67 IN

## 2024-09-04 DIAGNOSIS — B35.1 ONYCHOMYCOSIS: ICD-10-CM

## 2024-09-04 DIAGNOSIS — L85.1 ACQUIRED KERATODERMA: ICD-10-CM

## 2024-09-04 DIAGNOSIS — E11.42 DIABETIC POLYNEUROPATHY ASSOCIATED WITH TYPE 2 DIABETES MELLITUS (HCC): Primary | ICD-10-CM

## 2024-09-04 PROCEDURE — 11056 PARNG/CUTG B9 HYPRKR LES 2-4: CPT | Performed by: PODIATRIST

## 2024-09-04 PROCEDURE — 11721 DEBRIDE NAIL 6 OR MORE: CPT | Performed by: PODIATRIST

## 2024-09-04 RX ORDER — SULFASALAZINE 500 MG/1
TABLET ORAL
COMMUNITY
Start: 2024-07-26

## 2024-09-04 NOTE — PROGRESS NOTES
PATIENT:  Kathya Tong  1960    ASSESSMENT/PLAN:  1. Diabetic polyneuropathy associated with type 2 diabetes mellitus (HCC)        2. Acquired keratoderma        3. Onychomycosis                     Disease prevention and related risk factors of diabetes were identified and discussed.  The patient was educated in proper foot wear for diabetics.  Also educated in daily foot assessment and routine diabetic foot care.  Discussed the importance of controlling BS through diet and exercise.  Instructed her to call to make an appointment for new DM shoes.  The patient will follow up in 9 weeks for further diabetic foot exam and care.      PROCEDURE:  All mycotic toenails were reduced and debrided in length, width, and girth using a nail nipper and dremel.  All hyperkeratotic skin lesion(s) were sharply pared with a scalpel / forcep with no bleeding or evidence of ulceration.  Patient tolerated procedure(s) well without complications.      HPI:  Kathya Tong is a 64 y.o.year old female seen for diabetic foot exam.  She has high risk diabetic foot with history of DFU and Charcot arthropathy.  BS is under control.  She has numbness and paresthesia in her feet due to neuropathy.  No wounds in her feet. The patient denied any acute pedal disorder, redness, acute swelling, or recent injury.      PAST MEDICAL HISTORY:  Past Medical History:   Diagnosis Date    Arthritis 11/2021    Charcot ankle     Charcot foot due to diabetes mellitus (HCC) 2018    Diabetes mellitus (HCC)     Hammer toe 2013    Neuropathy in diabetes ([facility]) 2011    RA (rheumatoid arthritis) ([facility]) 11/2021       PAST SURGICAL HISTORY:  Past Surgical History:   Procedure Laterality Date    APPENDECTOMY  1980    CHOLECYSTECTOMY  1986    CORRECTION HAMMER TOE  2013    FOOT SURGERY  2013        ALLERGIES:  Cefaclor, Metformin, and Penicillins    MEDICATIONS:  Current Outpatient Medications   Medication Sig Dispense Refill    ammonium lactate (LAC-HYDRIN)  12 % lotion Apply topically 2 (two) times a day as needed for dry skin 225 g 10    DULoxetine (CYMBALTA) 30 mg delayed release capsule 60 mg      Enbrel SureClick 50 MG/ML injection       glyBURIDE (DIABETA) 2.5 mg tablet TAKE 1 TABLET DAILY WITH BREAKFAST OR THE FIRST MAIN MEAL OF THE DAY      hydroxychloroquine (PLAQUENIL) 200 mg tablet Take 200 mg by mouth 2 (two) times a day      losartan (COZAAR) 25 mg tablet Take by mouth      Rybelsus 14 MG tablet 1 TABLET DAILY AT LEAST 30 MINUTES BEFORE 1ST FOOD,BEVERAGE, OR OTHER ORAL MEDICINE OF DAY      simvastatin (ZOCOR) 5 MG tablet       sulfaSALAzine (AZULFIDINE) 500 mg tablet TAKE 1 TABLET TWICE A DAY. INCREASE TO 2 TABLETS TWICE A DAY AFTER 1 WEEK.      calcitonin, salmon, (MIACALCIN) 200 units/act nasal spray 1 spray into each nostril daily 3.7 mL 1    etanercept (Enbrel) 50 mg/mL SOSY 1 mL Subcutaneous      folic acid (FOLVITE) 1 mg tablet Take 1,000 mcg by mouth daily      meloxicam (MOBIC) 7.5 mg tablet TAKE 1 TABLET DAILY-- CAN INCREASE TO 2 TABLETS DAILY IF NEEDED      methotrexate 2.5 mg tablet 25mg weekly per pt      predniSONE 5 mg tablet Take 5 mg by mouth daily      Rybelsus 7 MG tablet  (Patient not taking: Reported on 4/24/2024)      sertraline (ZOLOFT) 100 mg tablet       sertraline (ZOLOFT) 50 mg tablet Take 50 mg by mouth daily       No current facility-administered medications for this visit.       SOCIAL HISTORY:  Social History     Socioeconomic History    Marital status: /Civil Union     Spouse name: None    Number of children: None    Years of education: None    Highest education level: None   Occupational History    None   Tobacco Use    Smoking status: Never    Smokeless tobacco: Never   Vaping Use    Vaping status: Never Used   Substance and Sexual Activity    Alcohol use: Not Currently    Drug use: Never    Sexual activity: Yes     Partners: Male     Birth control/protection: Post-menopausal, Surgical   Other Topics Concern    None  "  Social History Narrative    None     Social Determinants of Health     Financial Resource Strain: Not on file   Food Insecurity: Not on file   Transportation Needs: Not on file   Physical Activity: Not on file   Stress: Not on file   Social Connections: Not on file   Intimate Partner Violence: Not on file   Housing Stability: Not on file        REVIEW OF SYSTEMS:  GENERAL: No fever or chills  HEART: No chest pain, or palpitation  RESPIRATORY:  No acute SOB or cough  GI: No Nausea, vomit or diarrhea  NEUROLOGIC: No syncope or acute weakness    PHYSICAL EXAM:    /72 (BP Location: Left arm, Patient Position: Sitting, Cuff Size: Adult)   Ht 5' 7\" (1.702 m) Comment: stated  Wt 83.9 kg (185 lb)   BMI 28.98 kg/m²     VASCULAR EXAM  Dorsalis pedis  +1, Posterior tibial artery  +1.  The patient has class findings with skin atrophy, lack of digital hair, and nail dystrophy.  There is trace lower extremity edema bilaterally.  Venous stasis skin changes noted BLE.    NEUROLOGIC EXAM  Sensation is intact to light touch.  Sensation is decreased to 10gm monofilament.  No focal neurologic deficit.          DERMATOLOGIC EXAM:   No ulcer or cellulitis noted.  The patient has hypertrophic toenails with discoloration, onycholysis, and subungal debris.   Patient has HPK X 2 in right hallux and left submet 5.      MUSCULOSKELETAL EXAM:   No acute joint pain, edema, or redness.  No acute musculoskeletal problem.  Chronic Charcot foot deformity on left side.          "

## 2024-11-06 ENCOUNTER — OFFICE VISIT (OUTPATIENT)
Dept: PODIATRY | Facility: CLINIC | Age: 64
End: 2024-11-06
Payer: COMMERCIAL

## 2024-11-06 VITALS
HEART RATE: 86 BPM | BODY MASS INDEX: 28.09 KG/M2 | DIASTOLIC BLOOD PRESSURE: 80 MMHG | SYSTOLIC BLOOD PRESSURE: 128 MMHG | WEIGHT: 179 LBS | HEIGHT: 67 IN

## 2024-11-06 DIAGNOSIS — L85.1 ACQUIRED KERATODERMA: ICD-10-CM

## 2024-11-06 DIAGNOSIS — E11.42 DIABETIC POLYNEUROPATHY ASSOCIATED WITH TYPE 2 DIABETES MELLITUS (HCC): Primary | ICD-10-CM

## 2024-11-06 DIAGNOSIS — B35.1 ONYCHOMYCOSIS: ICD-10-CM

## 2024-11-06 PROCEDURE — 11056 PARNG/CUTG B9 HYPRKR LES 2-4: CPT | Performed by: PODIATRIST

## 2024-11-06 PROCEDURE — 11721 DEBRIDE NAIL 6 OR MORE: CPT | Performed by: PODIATRIST

## 2024-11-06 NOTE — PROGRESS NOTES
PATIENT:  Kathya Tong  1960    ASSESSMENT/PLAN:  1. Diabetic polyneuropathy associated with type 2 diabetes mellitus (HCC)        2. Onychomycosis        3. Acquired keratoderma                     Disease prevention and related risk factors of diabetes were identified and discussed.  The patient was educated in proper foot wear for diabetics.  Also educated in daily foot assessment and routine diabetic foot care.  The patient will follow up in 9 weeks for further diabetic foot exam and care.      PROCEDURE:  All mycotic toenails were reduced and debrided in length, width, and girth using a nail nipper and dremel.  All hyperkeratotic skin lesion(s) were sharply pared with a scalpel / forcep with no bleeding or evidence of ulceration.  Patient tolerated procedure(s) well without complications.      HPI:  Kathya Tong is a 64 y.o.year old female seen for diabetic foot exam.  She has high risk diabetic foot with history of DFU and Charcot arthropathy.  BS is under control.  She has numbness and paresthesia in her feet due to neuropathy.  The patient denied any acute pedal disorder, acute swelling, or recent injury.      PAST MEDICAL HISTORY:  Past Medical History:   Diagnosis Date    Arthritis 11/2021    Charcot ankle     Charcot foot due to diabetes mellitus (HCC) 2018    Diabetes mellitus (HCC)     Hammer toe 2013    Neuropathy in diabetes ([facility]) 2011    RA (rheumatoid arthritis) ([facility]) 11/2021       PAST SURGICAL HISTORY:  Past Surgical History:   Procedure Laterality Date    APPENDECTOMY  1980    CHOLECYSTECTOMY  1986    CORRECTION HAMMER TOE  2013    FOOT SURGERY  2013        ALLERGIES:  Cefaclor, Metformin, and Penicillins    MEDICATIONS:  Current Outpatient Medications   Medication Sig Dispense Refill    ammonium lactate (LAC-HYDRIN) 12 % lotion Apply topically 2 (two) times a day as needed for dry skin 225 g 10    Enbrel SureClick 50 MG/ML injection       glyBURIDE (DIABETA) 2.5 mg tablet TAKE 1  TABLET DAILY WITH BREAKFAST OR THE FIRST MAIN MEAL OF THE DAY      hydroxychloroquine (PLAQUENIL) 200 mg tablet Take 200 mg by mouth 2 (two) times a day      losartan (COZAAR) 25 mg tablet Take by mouth      Rybelsus 14 MG tablet 1 TABLET DAILY AT LEAST 30 MINUTES BEFORE 1ST FOOD,BEVERAGE, OR OTHER ORAL MEDICINE OF DAY      simvastatin (ZOCOR) 5 MG tablet       sulfaSALAzine (AZULFIDINE) 500 mg tablet TAKE 1 TABLET TWICE A DAY. INCREASE TO 2 TABLETS TWICE A DAY AFTER 1 WEEK.      calcitonin, salmon, (MIACALCIN) 200 units/act nasal spray 1 spray into each nostril daily 3.7 mL 1    DULoxetine (CYMBALTA) 30 mg delayed release capsule 60 mg      etanercept (Enbrel) 50 mg/mL SOSY 1 mL Subcutaneous      folic acid (FOLVITE) 1 mg tablet Take 1,000 mcg by mouth daily      meloxicam (MOBIC) 7.5 mg tablet TAKE 1 TABLET DAILY-- CAN INCREASE TO 2 TABLETS DAILY IF NEEDED      methotrexate 2.5 mg tablet 25mg weekly per pt      predniSONE 5 mg tablet Take 5 mg by mouth daily      Rybelsus 7 MG tablet  (Patient not taking: Reported on 4/24/2024)      sertraline (ZOLOFT) 100 mg tablet       sertraline (ZOLOFT) 50 mg tablet Take 50 mg by mouth daily       No current facility-administered medications for this visit.       SOCIAL HISTORY:  Social History     Socioeconomic History    Marital status: /Civil Union     Spouse name: None    Number of children: None    Years of education: None    Highest education level: None   Occupational History    None   Tobacco Use    Smoking status: Never    Smokeless tobacco: Never   Vaping Use    Vaping status: Never Used   Substance and Sexual Activity    Alcohol use: Not Currently    Drug use: Never    Sexual activity: Yes     Partners: Male     Birth control/protection: Post-menopausal, Surgical   Other Topics Concern    None   Social History Narrative    None     Social Determinants of Health     Financial Resource Strain: Not on file   Food Insecurity: Not on file   Transportation Needs:  "Not on file   Physical Activity: Not on file   Stress: Not on file   Social Connections: Not on file   Intimate Partner Violence: Not on file   Housing Stability: Not on file        REVIEW OF SYSTEMS:  GENERAL: No fever or chills  HEART: No chest pain, or palpitation  RESPIRATORY:  No acute SOB or cough  GI: No Nausea, vomit or diarrhea  NEUROLOGIC: No syncope or acute weakness    PHYSICAL EXAM:    /80 (BP Location: Left arm, Patient Position: Sitting, Cuff Size: Adult)   Pulse 86   Ht 5' 7\" (1.702 m) Comment: stated  Wt 81.2 kg (179 lb)   BMI 28.04 kg/m²     VASCULAR EXAM  Dorsalis pedis  +1, Posterior tibial artery  +1.  The patient has class findings with skin atrophy, lack of digital hair, and nail dystrophy.  There is trace lower extremity edema bilaterally.  Venous stasis skin changes noted BLE.    NEUROLOGIC EXAM  Sensation is intact to light touch.  Sensation is decreased to 10gm monofilament.  No focal neurologic deficit.          DERMATOLOGIC EXAM:   No ulcer or cellulitis noted.  The patient has hypertrophic toenails with discoloration, onycholysis, and subungal debris.   Patient has HPK X 2 in right hallux and left submet 5.      MUSCULOSKELETAL EXAM:   No acute joint pain, edema, or redness.  No acute musculoskeletal problem.  Chronic Charcot foot deformity on left side.          "

## 2024-12-05 ENCOUNTER — HOSPITAL ENCOUNTER (OUTPATIENT)
Dept: HOSPITAL 99 - WDC | Age: 64
End: 2024-12-05
Payer: COMMERCIAL

## 2024-12-05 DIAGNOSIS — Z12.31: Primary | ICD-10-CM

## 2025-01-15 ENCOUNTER — OFFICE VISIT (OUTPATIENT)
Dept: PODIATRY | Facility: CLINIC | Age: 65
End: 2025-01-15
Payer: COMMERCIAL

## 2025-01-15 VITALS — WEIGHT: 181 LBS | BODY MASS INDEX: 28.41 KG/M2 | HEIGHT: 67 IN

## 2025-01-15 DIAGNOSIS — L85.1 ACQUIRED KERATODERMA: ICD-10-CM

## 2025-01-15 DIAGNOSIS — E11.42 DIABETIC POLYNEUROPATHY ASSOCIATED WITH TYPE 2 DIABETES MELLITUS (HCC): Primary | ICD-10-CM

## 2025-01-15 DIAGNOSIS — B35.1 ONYCHOMYCOSIS: ICD-10-CM

## 2025-01-15 PROCEDURE — 11721 DEBRIDE NAIL 6 OR MORE: CPT | Performed by: PODIATRIST

## 2025-01-15 PROCEDURE — 11056 PARNG/CUTG B9 HYPRKR LES 2-4: CPT | Performed by: PODIATRIST

## 2025-01-15 NOTE — PROGRESS NOTES
PATIENT:  Kathya Tong  1960    ASSESSMENT/PLAN:  1. Diabetic polyneuropathy associated with type 2 diabetes mellitus (HCC)        2. Onychomycosis        3. Acquired keratoderma                     Disease prevention and related risk factors of diabetes were identified and discussed.  The patient was educated in proper foot wear for diabetics.  Educated in daily foot assessment and routine diabetic foot care.  The patient will follow up in 10 weeks for further diabetic foot exam and care.      PROCEDURE:  All mycotic toenails were reduced and debrided in length, width, and girth using a nail nipper and dremel.  All hyperkeratotic skin lesion(s) were sharply pared with a scalpel / forcep with no bleeding or evidence of ulceration.  Patient tolerated procedure(s) well without complications.      HPI:  Kathya Tong is a 64 y.o.year old female seen for diabetic foot exam.  She has high risk diabetic foot with history of DFU and Charcot arthropathy.  BS is under control.  She has numbness and paresthesia in her feet due to neuropathy.  The patient denied any acute pedal disorder, acute swelling, or recent injury.      PAST MEDICAL HISTORY:  Past Medical History:   Diagnosis Date    Arthritis 11/2021    Charcot ankle     Charcot foot due to diabetes mellitus (HCC) 2018    Diabetes mellitus (HCC)     Hammer toe 2013    Neuropathy in diabetes ([facility]) 2011    RA (rheumatoid arthritis) ([facility]) 11/2021       PAST SURGICAL HISTORY:  Past Surgical History:   Procedure Laterality Date    APPENDECTOMY  1980    CHOLECYSTECTOMY  1986    CORRECTION HAMMER TOE  2013    FOOT SURGERY  2013        ALLERGIES:  Cefaclor, Metformin, and Penicillins    MEDICATIONS:  Current Outpatient Medications   Medication Sig Dispense Refill    ammonium lactate (LAC-HYDRIN) 12 % lotion Apply topically 2 (two) times a day as needed for dry skin 225 g 10    DULoxetine (CYMBALTA) 30 mg delayed release capsule 60 mg      Enbrel SureClick 50 MG/ML  injection       hydroxychloroquine (PLAQUENIL) 200 mg tablet Take 200 mg by mouth 2 (two) times a day      losartan (COZAAR) 25 mg tablet Take by mouth      Rybelsus 14 MG tablet 1 TABLET DAILY AT LEAST 30 MINUTES BEFORE 1ST FOOD,BEVERAGE, OR OTHER ORAL MEDICINE OF DAY      simvastatin (ZOCOR) 5 MG tablet       sulfaSALAzine (AZULFIDINE) 500 mg tablet TAKE 1 TABLET TWICE A DAY. INCREASE TO 2 TABLETS TWICE A DAY AFTER 1 WEEK.      calcitonin, salmon, (MIACALCIN) 200 units/act nasal spray 1 spray into each nostril daily 3.7 mL 1    etanercept (Enbrel) 50 mg/mL SOSY 1 mL Subcutaneous      folic acid (FOLVITE) 1 mg tablet Take 1,000 mcg by mouth daily      glyBURIDE (DIABETA) 2.5 mg tablet TAKE 1 TABLET DAILY WITH BREAKFAST OR THE FIRST MAIN MEAL OF THE DAY (Patient not taking: Reported on 1/15/2025)      meloxicam (MOBIC) 7.5 mg tablet TAKE 1 TABLET DAILY-- CAN INCREASE TO 2 TABLETS DAILY IF NEEDED      methotrexate 2.5 mg tablet 25mg weekly per pt      predniSONE 5 mg tablet Take 5 mg by mouth daily      Rybelsus 7 MG tablet  (Patient not taking: Reported on 4/24/2024)      sertraline (ZOLOFT) 100 mg tablet       sertraline (ZOLOFT) 50 mg tablet Take 50 mg by mouth daily       No current facility-administered medications for this visit.       SOCIAL HISTORY:  Social History     Socioeconomic History    Marital status: /Civil Union     Spouse name: None    Number of children: None    Years of education: None    Highest education level: None   Occupational History    None   Tobacco Use    Smoking status: Never    Smokeless tobacco: Never   Vaping Use    Vaping status: Never Used   Substance and Sexual Activity    Alcohol use: Not Currently    Drug use: Never    Sexual activity: Yes     Partners: Male     Birth control/protection: Post-menopausal, Surgical   Other Topics Concern    None   Social History Narrative    None     Social Drivers of Health     Financial Resource Strain: Not on file   Food Insecurity: Not  "on file   Transportation Needs: Not on file   Physical Activity: Not on file   Stress: Not on file   Social Connections: Not on file   Intimate Partner Violence: Not on file   Housing Stability: Not on file        REVIEW OF SYSTEMS:  GENERAL: No fever or chills  HEART: No chest pain, or palpitation  RESPIRATORY:  No acute SOB or cough  GI: No Nausea, vomit or diarrhea  NEUROLOGIC: No syncope or acute weakness    PHYSICAL EXAM:    Ht 5' 7\" (1.702 m) Comment: stated  Wt 82.1 kg (181 lb)   BMI 28.35 kg/m²     VASCULAR EXAM  Dorsalis pedis  +1, Posterior tibial artery  +1.  The patient has class findings with skin atrophy, lack of digital hair, and nail dystrophy.  There is trace lower extremity edema bilaterally.  Venous stasis skin changes noted BLE.    NEUROLOGIC EXAM  Sensation is intact to light touch.  Sensation is decreased to 10gm monofilament.  No focal neurologic deficit.          DERMATOLOGIC EXAM:   No ulcer or cellulitis noted.  The patient has hypertrophic toenails with discoloration, onycholysis, and subungal debris.   Patient has HPK X 2 in right hallux and left submet 5.      MUSCULOSKELETAL EXAM:   No acute joint pain, edema, or redness.  No acute musculoskeletal problem.  Chronic Charcot foot deformity on left side.        Diabetic Foot Exam    Patient's shoes and socks removed.    Right Foot/Ankle   Right Foot Inspection  Skin Exam: skin intact, callus and callus. No erythema, no maceration and no ulcer.     Toe Exam: right toe deformity. No swelling, no tenderness and erythema    Sensory   Proprioception: intact  Monofilament testing: diminished    Vascular  Capillary refills: < 3 seconds  The right DP pulse is 1+. The right PT pulse is 1+.     Left Foot/Ankle  Left Foot Inspection  Skin Exam: skin intact and callus. No erythema, no maceration and no ulcer.     Toe Exam: left toe deformity. No swelling, no tenderness and no erythema.     Sensory   Proprioception: intact  Monofilament testing: " diminished    Vascular  Capillary refills: < 3 seconds  The left DP pulse is 1+. The left PT pulse is 1+.     Assign Risk Category  Deformity present  Loss of protective sensation  No weak pulses  Risk: 3

## 2025-02-18 ENCOUNTER — APPOINTMENT (OUTPATIENT)
Dept: URGENT CARE | Facility: CLINIC | Age: 65
End: 2025-02-18
Payer: OTHER MISCELLANEOUS

## 2025-02-18 PROCEDURE — 99283 EMERGENCY DEPT VISIT LOW MDM: CPT | Performed by: FAMILY MEDICINE

## 2025-02-18 PROCEDURE — G0382 LEV 3 HOSP TYPE B ED VISIT: HCPCS | Performed by: FAMILY MEDICINE

## 2025-02-21 ENCOUNTER — APPOINTMENT (OUTPATIENT)
Dept: URGENT CARE | Facility: CLINIC | Age: 65
End: 2025-02-21

## 2025-02-21 PROCEDURE — 99213 OFFICE O/P EST LOW 20 MIN: CPT | Performed by: FAMILY MEDICINE

## 2025-04-09 ENCOUNTER — OFFICE VISIT (OUTPATIENT)
Dept: PODIATRY | Facility: CLINIC | Age: 65
End: 2025-04-09
Payer: COMMERCIAL

## 2025-04-09 VITALS — BODY MASS INDEX: 27 KG/M2 | HEIGHT: 67 IN | WEIGHT: 172 LBS

## 2025-04-09 DIAGNOSIS — E11.42 DIABETIC POLYNEUROPATHY ASSOCIATED WITH TYPE 2 DIABETES MELLITUS (HCC): Primary | ICD-10-CM

## 2025-04-09 DIAGNOSIS — M14.672 CHARCOT'S JOINT OF LEFT FOOT: ICD-10-CM

## 2025-04-09 DIAGNOSIS — L85.1 ACQUIRED KERATODERMA: ICD-10-CM

## 2025-04-09 DIAGNOSIS — B35.1 ONYCHOMYCOSIS: ICD-10-CM

## 2025-04-09 PROCEDURE — 11056 PARNG/CUTG B9 HYPRKR LES 2-4: CPT | Performed by: PODIATRIST

## 2025-04-09 PROCEDURE — 11721 DEBRIDE NAIL 6 OR MORE: CPT | Performed by: PODIATRIST

## 2025-04-09 NOTE — PROGRESS NOTES
PATIENT:  Kathya Tong  1960    ASSESSMENT/PLAN:  1. Diabetic polyneuropathy associated with type 2 diabetes mellitus (HCC)        2. Onychomycosis        3. Charcot's joint of left foot        4. Acquired keratoderma                     Disease prevention and related risk factors of diabetes were identified and discussed.  The patient was educated in proper foot wear for diabetics.  Educated in daily foot assessment and routine diabetic foot care.  The patient will follow up in 12 weeks for further diabetic foot exam and care.      PROCEDURE:  All mycotic toenails were reduced and debrided in length, width, and girth using a nail nipper and dremel.  All hyperkeratotic skin lesion(s) were sharply pared with a scalpel / forcep with no bleeding or evidence of ulceration.  Patient tolerated procedure(s) well without complications.      HPI:  Kathya Tong is a 64 y.o.year old female seen for diabetic foot exam.  She has high risk diabetic foot with history of DFU and Charcot arthropathy.  BS is under control.  She has numbness and paresthesia in her feet due to neuropathy.  The patient denied any acute pedal disorder, acute swelling, or recent injury.      PAST MEDICAL HISTORY:  Past Medical History:   Diagnosis Date    Arthritis 11/2021    Charcot ankle     Charcot foot due to diabetes mellitus (HCC) 2018    Diabetes mellitus (HCC)     Hammer toe 2013    Neuropathy in diabetes ([facility]) 2011    RA (rheumatoid arthritis) ([facility]) 11/2021       PAST SURGICAL HISTORY:  Past Surgical History:   Procedure Laterality Date    APPENDECTOMY  1980    CHOLECYSTECTOMY  1986    CORRECTION HAMMER TOE  2013    FOOT SURGERY  2013        ALLERGIES:  Metformin, Penicillins, and Cefaclor    MEDICATIONS:  Current Outpatient Medications   Medication Sig Dispense Refill    ammonium lactate (LAC-HYDRIN) 12 % lotion Apply topically 2 (two) times a day as needed for dry skin 225 g 10    DULoxetine (CYMBALTA) 30 mg delayed release  capsule 60 mg      Enbrel SureClick 50 MG/ML injection       etanercept (Enbrel) 50 mg/mL SOSY 1 mL Subcutaneous      hydroxychloroquine (PLAQUENIL) 200 mg tablet Take 200 mg by mouth 2 (two) times a day      losartan (COZAAR) 25 mg tablet Take by mouth      Rybelsus 14 MG tablet 1 TABLET DAILY AT LEAST 30 MINUTES BEFORE 1ST FOOD,BEVERAGE, OR OTHER ORAL MEDICINE OF DAY      simvastatin (ZOCOR) 5 MG tablet       sulfaSALAzine (AZULFIDINE) 500 mg tablet TAKE 1 TABLET TWICE A DAY. INCREASE TO 2 TABLETS TWICE A DAY AFTER 1 WEEK.      calcitonin, salmon, (MIACALCIN) 200 units/act nasal spray 1 spray into each nostril daily 3.7 mL 1    folic acid (FOLVITE) 1 mg tablet Take 1,000 mcg by mouth daily      glyBURIDE (DIABETA) 2.5 mg tablet TAKE 1 TABLET DAILY WITH BREAKFAST OR THE FIRST MAIN MEAL OF THE DAY (Patient not taking: Reported on 1/15/2025)      meloxicam (MOBIC) 7.5 mg tablet TAKE 1 TABLET DAILY-- CAN INCREASE TO 2 TABLETS DAILY IF NEEDED      methotrexate 2.5 mg tablet 25mg weekly per pt      predniSONE 5 mg tablet Take 5 mg by mouth daily      Rybelsus 7 MG tablet  (Patient not taking: Reported on 4/24/2024)      sertraline (ZOLOFT) 100 mg tablet       sertraline (ZOLOFT) 50 mg tablet Take 50 mg by mouth daily       No current facility-administered medications for this visit.       SOCIAL HISTORY:  Social History     Socioeconomic History    Marital status: /Civil Union     Spouse name: None    Number of children: None    Years of education: None    Highest education level: None   Occupational History    None   Tobacco Use    Smoking status: Never    Smokeless tobacco: Never   Vaping Use    Vaping status: Never Used   Substance and Sexual Activity    Alcohol use: Not Currently    Drug use: Never    Sexual activity: Yes     Partners: Male     Birth control/protection: Post-menopausal, Surgical   Other Topics Concern    None   Social History Narrative    None     Social Drivers of Health     Financial  "Resource Strain: Not on file   Food Insecurity: Not on file   Transportation Needs: Not on file   Physical Activity: Not on file   Stress: Not on file   Social Connections: Not on file   Intimate Partner Violence: Not on file   Housing Stability: Not on file        REVIEW OF SYSTEMS:  GENERAL: No fever or chills  HEART: No chest pain, or palpitation  RESPIRATORY:  No acute SOB or cough  GI: No Nausea, vomit or diarrhea  NEUROLOGIC: No syncope or acute weakness    PHYSICAL EXAM:    Ht 5' 7\" (1.702 m)   Wt 78 kg (172 lb)   BMI 26.94 kg/m²     VASCULAR EXAM  Dorsalis pedis  +1, Posterior tibial artery  +1.  The patient has class findings with skin atrophy, lack of digital hair, and nail dystrophy.  There is trace lower extremity edema bilaterally.  Venous stasis skin changes noted BLE.    NEUROLOGIC EXAM  Sensation is intact to light touch.  Sensation is decreased to 10gm monofilament.  No focal neurologic deficit.          DERMATOLOGIC EXAM:   No ulcer or cellulitis noted.  The patient has hypertrophic toenails with discoloration, onycholysis, and subungal debris.   Patient has HPK X 2 in right hallux and left submet 5.      MUSCULOSKELETAL EXAM:   No acute joint pain, edema, or redness.  No acute musculoskeletal problem.  Chronic Charcot foot deformity on left side.          "

## 2025-07-02 ENCOUNTER — PROCEDURE VISIT (OUTPATIENT)
Dept: PODIATRY | Facility: CLINIC | Age: 65
End: 2025-07-02
Payer: COMMERCIAL

## 2025-07-02 VITALS — WEIGHT: 163 LBS | BODY MASS INDEX: 25.58 KG/M2 | HEIGHT: 67 IN

## 2025-07-02 DIAGNOSIS — E11.42 DIABETIC POLYNEUROPATHY ASSOCIATED WITH TYPE 2 DIABETES MELLITUS (HCC): Primary | ICD-10-CM

## 2025-07-02 DIAGNOSIS — L85.1 ACQUIRED KERATODERMA: ICD-10-CM

## 2025-07-02 DIAGNOSIS — B35.1 ONYCHOMYCOSIS: ICD-10-CM

## 2025-07-02 PROCEDURE — 11055 PARING/CUTG B9 HYPRKER LES 1: CPT | Performed by: PODIATRIST

## 2025-07-02 PROCEDURE — 11721 DEBRIDE NAIL 6 OR MORE: CPT | Performed by: PODIATRIST

## 2025-07-02 NOTE — PROGRESS NOTES
PATIENT:  Kathya Tong  1960    ASSESSMENT/PLAN:  1. Diabetic polyneuropathy associated with type 2 diabetes mellitus (HCC)        2. Onychomycosis        3. Acquired keratoderma                     Disease prevention and related risk factors of diabetes were identified and discussed.  The patient was educated in proper foot wear for diabetics.  Also educated in daily foot assessment and routine diabetic foot care.  The patient will follow up in 10 weeks for further diabetic foot exam and care.      PROCEDURE:  All mycotic toenails were reduced and debrided in length, width, and girth using a nail nipper and dremel.  All hyperkeratotic skin lesion(s) were sharply pared with a scalpel / forcep with no bleeding or evidence of ulceration.  Patient tolerated procedure(s) well without complications.      HPI:  Kathya Tong is a 64 y.o.year old female seen for diabetic foot exam.  She has high risk diabetic foot with history of DFU and Charcot arthropathy.  BS is under control.  She has numbness and paresthesia in her feet due to neuropathy.  No wounds in her feet. The patient denied any acute pedal disorder, redness, acute swelling, or recent injury.      PAST MEDICAL HISTORY:  Past Medical History:   Diagnosis Date    Arthritis 11/2021    Charcot ankle     Charcot foot due to diabetes mellitus (HCC) 2018    Diabetes mellitus (HCC)     Hammer toe 2013    Neuropathy in diabetes ([facility]) 2011    RA (rheumatoid arthritis) ([facility]) 11/2021       PAST SURGICAL HISTORY:  Past Surgical History:   Procedure Laterality Date    APPENDECTOMY  1980    CHOLECYSTECTOMY  1986    CORRECTION HAMMER TOE  2013    FOOT SURGERY  2013        ALLERGIES:  Metformin, Penicillins, and Cefaclor    MEDICATIONS:  Current Outpatient Medications   Medication Sig Dispense Refill    ammonium lactate (LAC-HYDRIN) 12 % lotion Apply topically 2 (two) times a day as needed for dry skin 225 g 10    DULoxetine (CYMBALTA) 30 mg delayed release capsule  "60 mg      etanercept (Enbrel) 50 mg/mL SOSY 1 mL Subcutaneous      hydroxychloroquine (PLAQUENIL) 200 mg tablet Take 200 mg by mouth 2 (two) times a day      losartan (COZAAR) 25 mg tablet Take by mouth      Rybelsus 14 MG tablet 1 TABLET DAILY AT LEAST 30 MINUTES BEFORE 1ST FOOD,BEVERAGE, OR OTHER ORAL MEDICINE OF DAY      simvastatin (ZOCOR) 5 MG tablet       sulfaSALAzine (AZULFIDINE) 500 mg tablet TAKE 1 TABLET TWICE A DAY. INCREASE TO 2 TABLETS TWICE A DAY AFTER 1 WEEK.      Enbrel SureClick 50 MG/ML injection       Rybelsus 7 MG tablet  (Patient not taking: Reported on 4/24/2024)       No current facility-administered medications for this visit.       SOCIAL HISTORY:  Social History     Socioeconomic History    Marital status: /Civil Union   Tobacco Use    Smoking status: Never    Smokeless tobacco: Never   Vaping Use    Vaping status: Never Used   Substance and Sexual Activity    Alcohol use: Not Currently    Drug use: Never    Sexual activity: Yes     Partners: Male     Birth control/protection: Post-menopausal, Surgical        REVIEW OF SYSTEMS:  GENERAL: No fever or chills  HEART: No chest pain, or palpitation  RESPIRATORY:  No acute SOB or cough  GI: No Nausea, vomit or diarrhea  NEUROLOGIC: No syncope or acute weakness    PHYSICAL EXAM:    Ht 5' 7\" (1.702 m) Comment: stated  Wt 73.9 kg (163 lb)   BMI 25.53 kg/m²     VASCULAR EXAM  Dorsalis pedis  +1, Posterior tibial artery  +1.  The patient has class findings with skin atrophy, lack of digital hair, and nail dystrophy.  There is trace lower extremity edema bilaterally.  Venous stasis skin changes noted BLE.    NEUROLOGIC EXAM  Sensation is intact to light touch.  Sensation is decreased to 10gm monofilament.  No focal neurologic deficit.          DERMATOLOGIC EXAM:   No ulcer or cellulitis noted.  The patient has hypertrophic toenails with discoloration, onycholysis, and subungal debris.   Patient has HPK X in right hallux.      MUSCULOSKELETAL " EXAM:   No acute joint pain, edema, or redness.  No acute musculoskeletal problem.  Chronic Charcot foot deformity on left side.